# Patient Record
Sex: FEMALE | Race: WHITE | NOT HISPANIC OR LATINO | Employment: STUDENT | URBAN - METROPOLITAN AREA
[De-identification: names, ages, dates, MRNs, and addresses within clinical notes are randomized per-mention and may not be internally consistent; named-entity substitution may affect disease eponyms.]

---
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
## 2024-03-10 DIAGNOSIS — Z30.41 SURVEILLANCE FOR BIRTH CONTROL, ORAL CONTRACEPTIVES: ICD-10-CM

## 2024-03-11 RX ORDER — NORETHINDRONE ACETATE/ETHINYL ESTRADIOL AND FERROUS FUMARATE 1MG-20(24)
1 KIT ORAL DAILY
Qty: 90 TABLET | Refills: 1 | Status: SHIPPED | OUTPATIENT
Start: 2024-03-11 | End: 2024-09-07

## 2024-06-13 LAB
HBA1C MFR BLD HPLC: 4.8 %
HBA1C MFR BLD HPLC: 4.8 %

## 2024-07-15 ENCOUNTER — ANNUAL EXAM (OUTPATIENT)
Dept: OBGYN CLINIC | Facility: CLINIC | Age: 19
End: 2024-07-15
Payer: COMMERCIAL

## 2024-07-15 VITALS
SYSTOLIC BLOOD PRESSURE: 110 MMHG | BODY MASS INDEX: 23.32 KG/M2 | WEIGHT: 140 LBS | HEIGHT: 65 IN | DIASTOLIC BLOOD PRESSURE: 70 MMHG

## 2024-07-15 DIAGNOSIS — R10.2 PELVIC PAIN IN FEMALE: ICD-10-CM

## 2024-07-15 DIAGNOSIS — Z11.3 SCREEN FOR STD (SEXUALLY TRANSMITTED DISEASE): ICD-10-CM

## 2024-07-15 DIAGNOSIS — Z30.41 SURVEILLANCE FOR BIRTH CONTROL, ORAL CONTRACEPTIVES: ICD-10-CM

## 2024-07-15 DIAGNOSIS — Z01.419 ENCOUNTER FOR GYNECOLOGICAL EXAMINATION WITHOUT ABNORMAL FINDING: Primary | ICD-10-CM

## 2024-07-15 DIAGNOSIS — N94.6 DYSMENORRHEA IN ADOLESCENT: ICD-10-CM

## 2024-07-15 DIAGNOSIS — N92.0 MENORRHAGIA WITH REGULAR CYCLE: ICD-10-CM

## 2024-07-15 DIAGNOSIS — N89.8 VAGINAL ODOR: ICD-10-CM

## 2024-07-15 LAB
BV WHIFF TEST VAG QL: NORMAL
CLUE CELLS SPEC QL WET PREP: NORMAL
PH SMN: 4 [PH]
SL AMB POCT WET MOUNT: NORMAL
T VAGINALIS VAG QL WET PREP: NORMAL
YEAST VAG QL WET PREP: NORMAL

## 2024-07-15 PROCEDURE — 87210 SMEAR WET MOUNT SALINE/INK: CPT | Performed by: OBSTETRICS & GYNECOLOGY

## 2024-07-15 PROCEDURE — 99395 PREV VISIT EST AGE 18-39: CPT | Performed by: OBSTETRICS & GYNECOLOGY

## 2024-07-15 RX ORDER — NORETHINDRONE ACETATE/ETHINYL ESTRADIOL AND FERROUS FUMARATE 1MG-20(24)
1 KIT ORAL DAILY
Qty: 90 TABLET | Refills: 3 | Status: SHIPPED | OUTPATIENT
Start: 2024-07-15 | End: 2025-07-15

## 2024-07-15 NOTE — PROGRESS NOTES
[facility] - [address], [address], [place] [address]    ASSESSMENT/PLAN: Nesha Cary is a 18 y.o.  who presents for annual gynecologic exam.    Encounter for routine gynecologic examination  - Routine well woman exam completed today.  - HPV Vaccination status: Immunization series complete  - STI screening offered including HIV testing: Declined  - Contraceptive counseling discussed.  Current contraception: condoms or combination OCPs:     Additional problems addressed during this visit:  1. Encounter for gynecological examination without abnormal finding  2. Surveillance for birth control, oral contraceptives  -     norethindrone-ethinyl estradiol-ferrous fumarate (Louann 24 FE) 1-20 MG-MCG(24) per tablet; Take 1 tablet by mouth daily Pt likes  28 d  w  Brand necessary  3. Screen for STD (sexually transmitted disease)  -     Chlamydia/GC RITIKA, Confirmation  4. Menorrhagia with regular cycle  5. Dysmenorrhea in adolescent  6. Pelvic pain in female  Comments:  Normal pelvic  us   done at  [facility]  7. Vaginal odor  -     POCT wet mount    Pt on ocp likes  brand necessary and not the 21 d.  Needs 28 d.  No missed pills or  btb. + lower abd pain normal pelvic us. Dw pt we treat the symptoms of  endometrioses.  Only true dx is  surgery.  Will do the  pill  w  only break for bleeding for 4 days .    Wet mount neg today.  No under wear to bed at night. Open to air  and   no panti liners daily     CC:  Annual Gynecologic Examination    HPI: Nesha Cary is a 18 y.o.  who presents for annual gynecologic examination.  19 yo  here for [facility] sex.  + sexually acti e  using condoms.  On ocp no missed pills denies  achesa and  BTB.  + had US  due to lower abd pain .  Possibly endometriosis.    Us was normal.  Done at   [facility].   Bowels wnl . + vaginal odor not fishy. Using  boric acid w lil relief. Same partner.       The following portions of the patient's history were reviewed and  "updated as appropriate: She  has a past medical history of Heavy periods and Seasonal allergies.  She  has no past surgical history on file.  Her family history includes Breast cancer (age of onset: 47) in her mother; Colon cancer in her family; Diabetes type I in her father and mother; Ovarian cancer in her family; Thyroid disease in her mother.  She  reports that she has never smoked. She has never been exposed to tobacco smoke. She has never used smokeless tobacco. She reports that she does not drink alcohol and does not use drugs.  Current Outpatient Medications   Medication Sig Dispense Refill   • ibuprofen (MOTRIN) 200 mg tablet Take by mouth every 6 (six) hours as needed     • norethindrone-ethinyl estradiol-ferrous fumarate (Louann 24 FE) 1-20 MG-MCG(24) per tablet Take 1 tablet by mouth daily Pt likes  28 d  w  Brand necessary 90 tablet 3     No current facility-administered medications for this visit.     She has No Known Allergies..    Review of Systems   Eyes: Negative.    Respiratory: Negative.     Cardiovascular: Negative.    Gastrointestinal:  Positive for abdominal pain.   Endocrine: Negative.    Genitourinary:  Positive for dyspareunia, pelvic pain, urgency and vaginal discharge.   Musculoskeletal: Negative.    Skin: Negative.    Allergic/Immunologic: Negative.    Neurological: Negative.    Hematological: Negative.    Psychiatric/Behavioral: Negative.           Objective:  /70 (BP Location: Left arm, Patient Position: Sitting, Cuff Size: Standard)   Ht 5' 5\" (1.651 m)   Wt 63.5 kg (140 lb)   BMI 23.30 kg/m²    Physical Exam  Vitals and nursing note reviewed.   Constitutional:       Appearance: Normal appearance.   HENT:      Head: Normocephalic.   Cardiovascular:      Rate and Rhythm: Normal rate and regular rhythm.      Pulses: Normal pulses.      Heart sounds: Normal heart sounds.   Pulmonary:      Effort: Pulmonary effort is normal.      Breath sounds: Normal breath sounds.   Chest:      " Chest wall: No mass, lacerations, swelling, tenderness or edema.   Breasts:     Chava Score is 4.      Breasts are symmetrical.      Right: Normal. No swelling, bleeding, inverted nipple, mass, nipple discharge, skin change or tenderness.      Left: No swelling, bleeding, inverted nipple, mass, nipple discharge, skin change or tenderness.   Abdominal:      General: Abdomen is flat. Bowel sounds are normal.      Palpations: Abdomen is soft.   Genitourinary:     General: Normal vulva.      Exam position: Lithotomy position.      Pubic Area: No rash.       Chava stage (genital): 4.      Labia:         Right: No rash, tenderness or lesion.         Left: No rash, tenderness or lesion.       Urethra: No urethral pain, urethral swelling or urethral lesion.      Vagina: Normal.      Cervix: No cervical motion tenderness or discharge.      Uterus: Normal.       Adnexa: Right adnexa normal and left adnexa normal.      Rectum: Normal.   Musculoskeletal:         General: Normal range of motion.      Cervical back: Neck supple.   Lymphadenopathy:      Upper Body:      Right upper body: No supraclavicular, axillary or pectoral adenopathy.      Left upper body: No supraclavicular, axillary or pectoral adenopathy.      Lower Body: No right inguinal adenopathy. No left inguinal adenopathy.   Skin:     General: Skin is warm and dry.   Neurological:      General: No focal deficit present.      Mental Status: She is alert and oriented to person, place, and time.   Psychiatric:         Mood and Affect: Mood normal.         Behavior: Behavior normal.         Thought Content: Thought content normal.         Judgment: Judgment normal.

## 2024-07-15 NOTE — PATIENT INSTRUCTIONS
Pap every 3 years if normal,  starting at age 21.  STI testing as indicated, exercise most days of week, obtain appropriate diet and hydration, Calcium 1000mg + 600 vit D daily, birth control as directed (ACHES reviewed). Benefits, risks and alternatives discussed/reviewed. Condom use when sexually active for sexually transmitted infection prevention. HPV 9 vaccine recommended through age 45. Check with your insurance for coverage. If covered, call office to schedule start of vaccine series.  Annual mammogram starting at age 40, monthly breast self exam. Kegels   at red light or at least  20 times a day.

## 2024-07-21 DIAGNOSIS — A74.9 CHLAMYDIA: Primary | ICD-10-CM

## 2024-07-21 LAB
C TRACH RRNA SPEC QL NAA+PROBE: POSITIVE
N GONORRHOEA RRNA SPEC QL NAA+PROBE: NEGATIVE
SL AMB C. TRACHOMATIS NAA, CONFIRM: POSITIVE

## 2024-07-21 RX ORDER — DOXYCYCLINE 100 MG/1
100 TABLET ORAL 2 TIMES DAILY
Qty: 14 TABLET | Refills: 0 | Status: SHIPPED | OUTPATIENT
Start: 2024-07-21 | End: 2024-07-28

## 2024-07-28 NOTE — PROGRESS NOTES
"PROBLEM GYNECOLOGICAL VISIT    Nesha Cary is a 18 y.o. female who presents today  follow up for   chlamydia   history has been reviewed and she reports it as follows:    Past Medical History:   Diagnosis Date   • Chlamydia 2024    treated doxy   • Heavy periods    • Seasonal allergies      History reviewed. No pertinent surgical history.  OB History        0    Para   0    Term   0       0    AB   0    Living   0       SAB   0    IAB   0    Ectopic   0    Multiple   0    Live Births   0           Obstetric Comments   Menarche at  11    Gardasil  vaccine            Social History     Tobacco Use   • Smoking status: Never     Passive exposure: Never   • Smokeless tobacco: Never   Vaping Use   • Vaping status: Never Used   Substance Use Topics   • Alcohol use: Never     Comment: No alcohol use    • Drug use: Never     Comment: No        Current Outpatient Medications   Medication Instructions   • ibuprofen (MOTRIN) 200 mg tablet Oral, Every 6 hours PRN   • norethindrone-ethinyl estradiol-ferrous fumarate (Louann 24 FE) 1-20 MG-MCG(24) per tablet 1 tablet, Oral, Daily, Pt likes  28 d  w  Brand necessary       History of Present Illness:   + chlamydia screen on        Review of Systems:  Review of Systems   Constitutional: Negative.    Genitourinary:  Positive for vaginal discharge. Negative for vaginal bleeding and vaginal pain.   Neurological: Negative.    Hematological: Negative.    Psychiatric/Behavioral: Negative.         Physical Exam:  BP 94/64 (BP Location: Left arm, Patient Position: Sitting, Cuff Size: Standard)   Ht 5' 5\" (1.651 m)   Wt 64.9 kg (143 lb)   BMI 23.80 kg/m²   Physical Exam  Constitutional:       Appearance: Normal appearance.   Neurological:      Mental Status: She is alert.   Psychiatric:         Mood and Affect: Mood normal.         Behavior: Behavior normal.         Thought Content: Thought content normal.         Judgment: Judgment normal. "           Assessment:   1. Screen for  std,  Hx of chlamydia    I have spent a total time of 20 minutes in caring for this patient on the day of the visit/encounter including Diagnostic results, Risks and benefits of tx options, Instructions for management, Patient and family education, Importance of tx compliance, Risk factor reductions, Counseling / Coordination of care, Documenting in the medical record, Reviewing / ordering tests, medicine, procedures  , and Obtaining or reviewing history  .     Plan:   Condoms, partner  must be treated.   Needs  to screen for other  STDS   Hep B, C  HIV and  RPR ordered.   .  The  [facility]  may contact patient to make sure  everyone is treated.  Transmission , symptoms  treatments and fu not greater than 12 weeks dw pt.  Condoms!  Partner is being treated today.  Fu  kirit in 3mo  .  Referred to CDC website and fu  in  12 weeks.     Reviewed with patient that test results are available in [facility] immediately, but that they will not necessarily be reviewed by me immediately.  Explained that I will review results at my earliest opportunity and contact patient appropriately.

## 2024-07-29 ENCOUNTER — OFFICE VISIT (OUTPATIENT)
Dept: GASTROENTEROLOGY | Facility: CLINIC | Age: 19
End: 2024-07-29
Payer: COMMERCIAL

## 2024-07-29 ENCOUNTER — TELEPHONE (OUTPATIENT)
Dept: GASTROENTEROLOGY | Facility: CLINIC | Age: 19
End: 2024-07-29

## 2024-07-29 ENCOUNTER — OFFICE VISIT (OUTPATIENT)
Dept: OBGYN CLINIC | Facility: CLINIC | Age: 19
End: 2024-07-29
Payer: COMMERCIAL

## 2024-07-29 VITALS
HEIGHT: 65 IN | DIASTOLIC BLOOD PRESSURE: 60 MMHG | SYSTOLIC BLOOD PRESSURE: 112 MMHG | BODY MASS INDEX: 23.93 KG/M2 | WEIGHT: 143.6 LBS

## 2024-07-29 VITALS
BODY MASS INDEX: 23.82 KG/M2 | DIASTOLIC BLOOD PRESSURE: 64 MMHG | WEIGHT: 143 LBS | SYSTOLIC BLOOD PRESSURE: 94 MMHG | HEIGHT: 65 IN

## 2024-07-29 DIAGNOSIS — K21.9 GASTROESOPHAGEAL REFLUX DISEASE, UNSPECIFIED WHETHER ESOPHAGITIS PRESENT: ICD-10-CM

## 2024-07-29 DIAGNOSIS — Z80.0 FAMILY HISTORY OF COLON CANCER: ICD-10-CM

## 2024-07-29 DIAGNOSIS — R19.7 DIARRHEA, UNSPECIFIED TYPE: ICD-10-CM

## 2024-07-29 DIAGNOSIS — R10.31 RIGHT LOWER QUADRANT ABDOMINAL PAIN: Primary | ICD-10-CM

## 2024-07-29 DIAGNOSIS — R63.4 WEIGHT LOSS, ABNORMAL: ICD-10-CM

## 2024-07-29 DIAGNOSIS — R10.2 PELVIC PAIN IN FEMALE: ICD-10-CM

## 2024-07-29 DIAGNOSIS — A74.9 CHLAMYDIA: Primary | ICD-10-CM

## 2024-07-29 DIAGNOSIS — Z11.3 SCREEN FOR STD (SEXUALLY TRANSMITTED DISEASE): ICD-10-CM

## 2024-07-29 DIAGNOSIS — R10.33 PERIUMBILICAL ABDOMINAL PAIN: ICD-10-CM

## 2024-07-29 PROCEDURE — 99213 OFFICE O/P EST LOW 20 MIN: CPT | Performed by: OBSTETRICS & GYNECOLOGY

## 2024-07-29 PROCEDURE — 99204 OFFICE O/P NEW MOD 45 MIN: CPT | Performed by: NURSE PRACTITIONER

## 2024-07-29 RX ORDER — OMEPRAZOLE 20 MG/1
20 CAPSULE, DELAYED RELEASE ORAL
Qty: 30 CAPSULE | Refills: 6 | Status: SHIPPED | OUTPATIENT
Start: 2024-07-29

## 2024-07-29 RX ORDER — POLYETHYLENE GLYCOL 3350 17 G/17G
238 POWDER, FOR SOLUTION ORAL ONCE
Qty: 238 G | Refills: 0 | Status: SHIPPED | OUTPATIENT
Start: 2024-07-29 | End: 2024-07-29

## 2024-07-29 RX ORDER — DICYCLOMINE HYDROCHLORIDE 10 MG/1
10 CAPSULE ORAL
Qty: 120 CAPSULE | Refills: 6 | Status: SHIPPED | OUTPATIENT
Start: 2024-07-29

## 2024-07-29 NOTE — PROGRESS NOTES
Count includes the [facility] - Outpatient Consultation  Nesha Cary 18 y.o. female MRN: [id number]  Encounter: [id number]    ASSESSMENT AND PLAN:      1. Right lower quadrant abdominal pain  2. Periumbilical abdominal pain  Patient presents for increased lower abdominal pain for approximately 6 months.  Patient states she does note increased discomfort after eating however not always directly after eating.  Noting patient's symptoms of diarrhea for same time.  Consider IBS vs IBD.  Patient is adopted, unclear if family history of Crohn's or autoimmune disorders.  Reevaluate with results of EGD and colonoscopy.    - Colonoscopy scheduled at [facility]  - polyethylene glycol (MiraLax) 17 GM/SCOOP powder; Take 238 g by mouth once for 1 dose Take 238 g my mouth. Use as directed  Dispense: 238 g; Refill: 0  - dicyclomine (BENTYL) 10 mg capsule; Take 1 capsule (10 mg total) by mouth 4 (four) times a day (before meals and at bedtime)  Dispense: 120 capsule; Refill: 6  -Discussed with patient dietary discretion    3. Diarrhea, unspecified type  Patient states that her episodes of diarrhea have also been around for about 6 months.  She states she is having 2-3 loose bowel movements per day.  Patient did have multiple stool studies completed which were all negative.  Consider infectious versus inflammatory.  Rule out IBS vs IBD.    - Colonoscopy scheduled at [facility]  -Discussed with patient dietary discretion and adequate fiber and fluids.  -Patient education for examples of dietary fiber attached to discharge summary    4. Gastroesophageal reflux disease, unspecified whether esophagitis present  Patient states she has been using Tums daily for increased acid reflux and breakthrough symptoms.  Discussed with patient initiating omeprazole 20 mg daily.  Consider GERD, functional dyspepsia, gastritis.    - EGD scheduled at [facility]  - omeprazole (PriLOSEC) 20 mg delayed release capsule; Take 1 capsule (20 mg total)  by mouth daily before breakfast  Dispense: 30 capsule; Refill: 6    5.  Weight loss  Patient does note that from her last doctor's office visit she was told she has lost approximately 20 pounds since November 2023.  Patient states she had been exercising and does note she had increased illness during that time period as well.  Reevaluate with the completion of both EGD and colonoscopy.  Extensive lab work was completed which all at this time have been negative.    6. Family history of colon cancer  Patient states she is adopted but however has been told that there is colon cancer in both her biological parents, possibly grandparents.      Followup Appointment: 2 weeks after procedures  ______________________________________________________________________    Chief Complaint   Patient presents with    Diarrhea     frequent    Abdominal Pain     lower       HPI:   18-year-old female with no significant past medical history presents with lower abdominal discomfort and diarrhea times approximately 6 months.    On exam, patient denies nausea or or vomiting.  She states she does have increased abdominal discomfort usually after eating.  States she does have increased acid reflux symptoms and has been taking Tums almost every day.  States she is having 2-3 loose bowel movements per day she does notice some hematochezia occasionally to the tissue however denies melena.  Non-smoker, rare EtOH use.  Patient states she has had approximately a 20 pound weight loss since November of this past year however she does states she had been exercising a little more however also had increased illness.  Recent lab work noted; negative celiac panel, CBC, CMP, TSH and iron panel all normal.  Stool studies for culture, C. difficile, ova and parasite and fecal calprotectin were normal.  Patient does states she had been on an antibiotic recently for STD.  Believes it was doxycycline for 7 days.  She denies increased NSAID use.  Patient states  "she is adopted however she has been told that her birth parents both have colon cancer in their family not the parents but possibly grandparents.      Historical Information   Past Medical History:   Diagnosis Date    Chlamydia 07/2024    treated doxy    Heavy periods     Seasonal allergies      Past Surgical History:   Procedure Laterality Date    WISDOM TOOTH EXTRACTION       Social History     Substance and Sexual Activity   Alcohol Use Never    Comment: No alcohol use      Social History     Substance and Sexual Activity   Drug Use Never    Comment: No      Social History     Tobacco Use   Smoking Status Never    Passive exposure: Never   Smokeless Tobacco Never     Family History   Adopted: Yes   Problem Relation Age of Onset    Breast cancer Mother 47    Thyroid disease Mother     Diabetes type I Mother     Diabetes type I Father     Colon cancer Family     Ovarian cancer Family        Meds/Allergies     Current Outpatient Medications:     dicyclomine (BENTYL) 10 mg capsule    ibuprofen (MOTRIN) 200 mg tablet    norethindrone-ethinyl estradiol-ferrous fumarate (Louann 24 FE) 1-20 MG-MCG(24) per tablet    omeprazole (PriLOSEC) 20 mg delayed release capsule    polyethylene glycol (MiraLax) 17 GM/SCOOP powder    No Known Allergies    PHYSICAL EXAM:    Blood pressure 112/60, height 5' 5\" (1.651 m), weight 65.1 kg (143 lb 9.6 oz). Body mass index is 23.9 kg/m².    General Appearance: NAD, cooperative, alert  Eyes: Anicteric, PERRLA, EOMI  ENT:  Normocephalic, atraumatic, normal mucosa.    Neck:  Supple, symmetrical, trachea midline,   Resp:  Clear to auscultation bilaterally; no rales, rhonchi or wheezing; respirations unlabored   CV:  S1 S2, Regular rate and rhythm; no murmur, rub, or gallop.  GI:  Soft, mid abdomen and right lower quadrant discomfort with palpation, non-distended; normal bowel sounds; no masses, no organomegaly   Rectal: Deferred  Musculoskeletal: No cyanosis, clubbing or edema. Normal ROM.  Skin: " " No jaundice, rashes, or lesions   Heme/Lymph: No palpable cervical lymphadenopathy  Psych: Normal affect, good eye contact  Neuro: No gross deficits, AAOx3    Lab Results:   Lab Results   Component Value Date    WBC 3.7 05/07/2021    HGB 13.6 05/07/2021    HCT 39.2 05/07/2021    MCV 91 05/07/2021     05/07/2021     No results found for: \"NA\", \"K\", \"CL\", \"CO2\", \"ANIONGAP\", \"BUN\", \"CREATININE\", \"GLUCOSE\", \"GLUF\", \"CALCIUM\", \"CORRECTEDCA\", \"AST\", \"ALT\", \"ALKPHOS\", \"PROT\", \"BILITOT\", \"EGFR\"  No results found for: \"IRON\", \"TIBC\", \"FERRITIN\"  No results found for: \"LIPASE\"    Radiology Results:   No results found.      REVIEW OF SYSTEMS:    CONSTITUTIONAL: Denies any fever, chills, rigors, and weight loss.  HEENT: No earache or tinnitus. Denies hearing loss or visual disturbances.  CARDIOVASCULAR: No chest pain or palpitations.   RESPIRATORY: Denies any cough, hemoptysis, shortness of breath or dyspnea on exertion.  GASTROINTESTINAL: As noted in the History of Present Illness.   GENITOURINARY: No problems with urination. Denies any hematuria or dysuria.  NEUROLOGIC: No dizziness or vertigo, denies headaches.   MUSCULOSKELETAL: Denies any muscle or joint pain.   SKIN: Denies skin rashes or itching.   ENDOCRINE: Denies excessive thirst. Denies intolerance to heat or cold.  PSYCHOSOCIAL: Denies depression or anxiety. Denies any recent memory loss.   "

## 2024-07-29 NOTE — H&P (VIEW-ONLY)
[facility] - Outpatient Consultation  Nesha Cary 18 y.o. female MRN: [id number]  Encounter: [id number]    ASSESSMENT AND PLAN:      1. Right lower quadrant abdominal pain  2. Periumbilical abdominal pain  Patient presents for increased lower abdominal pain for approximately 6 months.  Patient states she does note increased discomfort after eating however not always directly after eating.  Noting patient's symptoms of diarrhea for same time.  Consider IBS vs IBD.  Patient is adopted, unclear if family history of Crohn's or autoimmune disorders.  Reevaluate with results of EGD and colonoscopy.    - Colonoscopy scheduled at [facility]  - polyethylene glycol (MiraLax) 17 GM/SCOOP powder; Take 238 g by mouth once for 1 dose Take 238 g my mouth. Use as directed  Dispense: 238 g; Refill: 0  - dicyclomine (BENTYL) 10 mg capsule; Take 1 capsule (10 mg total) by mouth 4 (four) times a day (before meals and at bedtime)  Dispense: 120 capsule; Refill: 6  -Discussed with patient dietary discretion    3. Diarrhea, unspecified type  Patient states that her episodes of diarrhea have also been around for about 6 months.  She states she is having 2-3 loose bowel movements per day.  Patient did have multiple stool studies completed which were all negative.  Consider infectious versus inflammatory.  Rule out IBS vs IBD.    - Colonoscopy scheduled at [facility]  -Discussed with patient dietary discretion and adequate fiber and fluids.  -Patient education for examples of dietary fiber attached to discharge summary    4. Gastroesophageal reflux disease, unspecified whether esophagitis present  Patient states she has been using Tums daily for increased acid reflux and breakthrough symptoms.  Discussed with patient initiating omeprazole 20 mg daily.  Consider GERD, functional dyspepsia, gastritis.    - EGD scheduled at [facility]  - omeprazole (PriLOSEC) 20 mg delayed release capsule; Take 1 capsule (20 mg total)  by mouth daily before breakfast  Dispense: 30 capsule; Refill: 6    5.  Weight loss  Patient does note that from her last doctor's office visit she was told she has lost approximately 20 pounds since November 2023.  Patient states she had been exercising and does note she had increased illness during that time period as well.  Reevaluate with the completion of both EGD and colonoscopy.  Extensive lab work was completed which all at this time have been negative.    6. Family history of colon cancer  Patient states she is adopted but however has been told that there is colon cancer in both her biological parents, possibly grandparents.      Followup Appointment: 2 weeks after procedures  ______________________________________________________________________    Chief Complaint   Patient presents with    Diarrhea     frequent    Abdominal Pain     lower       HPI:   18-year-old female with no significant past medical history presents with lower abdominal discomfort and diarrhea times approximately 6 months.    On exam, patient denies nausea or or vomiting.  She states she does have increased abdominal discomfort usually after eating.  States she does have increased acid reflux symptoms and has been taking Tums almost every day.  States she is having 2-3 loose bowel movements per day she does notice some hematochezia occasionally to the tissue however denies melena.  Non-smoker, rare EtOH use.  Patient states she has had approximately a 20 pound weight loss since November of this past year however she does states she had been exercising a little more however also had increased illness.  Recent lab work noted; negative celiac panel, CBC, CMP, TSH and iron panel all normal.  Stool studies for culture, C. difficile, ova and parasite and fecal calprotectin were normal.  Patient does states she had been on an antibiotic recently for STD.  Believes it was doxycycline for 7 days.  She denies increased NSAID use.  Patient states  "she is adopted however she has been told that her birth parents both have colon cancer in their family not the parents but possibly grandparents.      Historical Information   Past Medical History:   Diagnosis Date    Chlamydia 07/2024    treated doxy    Heavy periods     Seasonal allergies      Past Surgical History:   Procedure Laterality Date    WISDOM TOOTH EXTRACTION       Social History     Substance and Sexual Activity   Alcohol Use Never    Comment: No alcohol use      Social History     Substance and Sexual Activity   Drug Use Never    Comment: No      Social History     Tobacco Use   Smoking Status Never    Passive exposure: Never   Smokeless Tobacco Never     Family History   Adopted: Yes   Problem Relation Age of Onset    Breast cancer Mother 47    Thyroid disease Mother     Diabetes type I Mother     Diabetes type I Father     Colon cancer Family     Ovarian cancer Family        Meds/Allergies     Current Outpatient Medications:     dicyclomine (BENTYL) 10 mg capsule    ibuprofen (MOTRIN) 200 mg tablet    norethindrone-ethinyl estradiol-ferrous fumarate (Louann 24 FE) 1-20 MG-MCG(24) per tablet    omeprazole (PriLOSEC) 20 mg delayed release capsule    polyethylene glycol (MiraLax) 17 GM/SCOOP powder    No Known Allergies    PHYSICAL EXAM:    Blood pressure 112/60, height 5' 5\" (1.651 m), weight 65.1 kg (143 lb 9.6 oz). Body mass index is 23.9 kg/m².    General Appearance: NAD, cooperative, alert  Eyes: Anicteric, PERRLA, EOMI  ENT:  Normocephalic, atraumatic, normal mucosa.    Neck:  Supple, symmetrical, trachea midline,   Resp:  Clear to auscultation bilaterally; no rales, rhonchi or wheezing; respirations unlabored   CV:  S1 S2, Regular rate and rhythm; no murmur, rub, or gallop.  GI:  Soft, mid abdomen and right lower quadrant discomfort with palpation, non-distended; normal bowel sounds; no masses, no organomegaly   Rectal: Deferred  Musculoskeletal: No cyanosis, clubbing or edema. Normal ROM.  Skin: " " No jaundice, rashes, or lesions   Heme/Lymph: No palpable cervical lymphadenopathy  Psych: Normal affect, good eye contact  Neuro: No gross deficits, AAOx3    Lab Results:   Lab Results   Component Value Date    WBC 3.7 05/07/2021    HGB 13.6 05/07/2021    HCT 39.2 05/07/2021    MCV 91 05/07/2021     05/07/2021     No results found for: \"NA\", \"K\", \"CL\", \"CO2\", \"ANIONGAP\", \"BUN\", \"CREATININE\", \"GLUCOSE\", \"GLUF\", \"CALCIUM\", \"CORRECTEDCA\", \"AST\", \"ALT\", \"ALKPHOS\", \"PROT\", \"BILITOT\", \"EGFR\"  No results found for: \"IRON\", \"TIBC\", \"FERRITIN\"  No results found for: \"LIPASE\"    Radiology Results:   No results found.      REVIEW OF SYSTEMS:    CONSTITUTIONAL: Denies any fever, chills, rigors, and weight loss.  HEENT: No earache or tinnitus. Denies hearing loss or visual disturbances.  CARDIOVASCULAR: No chest pain or palpitations.   RESPIRATORY: Denies any cough, hemoptysis, shortness of breath or dyspnea on exertion.  GASTROINTESTINAL: As noted in the History of Present Illness.   GENITOURINARY: No problems with urination. Denies any hematuria or dysuria.  NEUROLOGIC: No dizziness or vertigo, denies headaches.   MUSCULOSKELETAL: Denies any muscle or joint pain.   SKIN: Denies skin rashes or itching.   ENDOCRINE: Denies excessive thirst. Denies intolerance to heat or cold.  PSYCHOSOCIAL: Denies depression or anxiety. Denies any recent memory loss.   "

## 2024-07-29 NOTE — TELEPHONE ENCOUNTER
Scheduled date of combo (as of today):08/07/24  Physician performing combo:Michael  [facility]  Bowel prep reviewed with patient:Miralax  Instructions reviewed with patient by:LISA  Clearances: NONE

## 2024-07-30 LAB
HCV AB S/CO SERPL IA: NON REACTIVE
HIV 1+2 AB+HIV1 P24 AG SERPL QL IA: NON REACTIVE
RPR SER QL: NON REACTIVE

## 2024-08-01 ENCOUNTER — ANESTHESIA EVENT (OUTPATIENT)
Dept: ANESTHESIOLOGY | Facility: AMBULATORY SURGERY CENTER | Age: 19
End: 2024-08-01

## 2024-08-01 ENCOUNTER — ANESTHESIA (OUTPATIENT)
Dept: ANESTHESIOLOGY | Facility: AMBULATORY SURGERY CENTER | Age: 19
End: 2024-08-01

## 2024-08-07 ENCOUNTER — ANESTHESIA (OUTPATIENT)
Dept: GASTROENTEROLOGY | Facility: AMBULATORY SURGERY CENTER | Age: 19
End: 2024-08-07

## 2024-08-07 ENCOUNTER — HOSPITAL ENCOUNTER (OUTPATIENT)
Dept: GASTROENTEROLOGY | Facility: AMBULATORY SURGERY CENTER | Age: 19
Discharge: HOME | End: 2024-08-07
Payer: COMMERCIAL

## 2024-08-07 ENCOUNTER — ANESTHESIA EVENT (OUTPATIENT)
Dept: GASTROENTEROLOGY | Facility: AMBULATORY SURGERY CENTER | Age: 19
End: 2024-08-07

## 2024-08-07 VITALS
BODY MASS INDEX: 23.49 KG/M2 | TEMPERATURE: 99.3 F | HEART RATE: 61 BPM | SYSTOLIC BLOOD PRESSURE: 109 MMHG | RESPIRATION RATE: 16 BRPM | HEIGHT: 65 IN | DIASTOLIC BLOOD PRESSURE: 64 MMHG | WEIGHT: 141 LBS | OXYGEN SATURATION: 100 %

## 2024-08-07 DIAGNOSIS — R10.31 RIGHT LOWER QUADRANT ABDOMINAL PAIN: ICD-10-CM

## 2024-08-07 DIAGNOSIS — K21.9 GASTROESOPHAGEAL REFLUX DISEASE, UNSPECIFIED WHETHER ESOPHAGITIS PRESENT: ICD-10-CM

## 2024-08-07 DIAGNOSIS — R19.7 DIARRHEA, UNSPECIFIED TYPE: ICD-10-CM

## 2024-08-07 LAB
EXT PREGNANCY TEST URINE: NEGATIVE
EXT. CONTROL: NORMAL

## 2024-08-07 PROCEDURE — 43239 EGD BIOPSY SINGLE/MULTIPLE: CPT | Performed by: INTERNAL MEDICINE

## 2024-08-07 PROCEDURE — 45380 COLONOSCOPY AND BIOPSY: CPT | Performed by: INTERNAL MEDICINE

## 2024-08-07 PROCEDURE — 88305 TISSUE EXAM BY PATHOLOGIST: CPT | Performed by: PATHOLOGY

## 2024-08-07 RX ORDER — LIDOCAINE HYDROCHLORIDE 10 MG/ML
INJECTION, SOLUTION EPIDURAL; INFILTRATION; INTRACAUDAL; PERINEURAL AS NEEDED
Status: DISCONTINUED | OUTPATIENT
Start: 2024-08-07 | End: 2024-08-07

## 2024-08-07 RX ORDER — SODIUM CHLORIDE, SODIUM LACTATE, POTASSIUM CHLORIDE, CALCIUM CHLORIDE 600; 310; 30; 20 MG/100ML; MG/100ML; MG/100ML; MG/100ML
50 INJECTION, SOLUTION INTRAVENOUS CONTINUOUS
Status: DISCONTINUED | OUTPATIENT
Start: 2024-08-07 | End: 2024-08-11 | Stop reason: HOSPADM

## 2024-08-07 RX ORDER — PROPOFOL 10 MG/ML
INJECTION, EMULSION INTRAVENOUS AS NEEDED
Status: DISCONTINUED | OUTPATIENT
Start: 2024-08-07 | End: 2024-08-07

## 2024-08-07 RX ADMIN — PROPOFOL 50 MG: 10 INJECTION, EMULSION INTRAVENOUS at 10:50

## 2024-08-07 RX ADMIN — PROPOFOL 30 MG: 10 INJECTION, EMULSION INTRAVENOUS at 11:04

## 2024-08-07 RX ADMIN — PROPOFOL 50 MG: 10 INJECTION, EMULSION INTRAVENOUS at 10:56

## 2024-08-07 RX ADMIN — LIDOCAINE HYDROCHLORIDE 40 MG: 10 INJECTION, SOLUTION EPIDURAL; INFILTRATION; INTRACAUDAL; PERINEURAL at 10:48

## 2024-08-07 RX ADMIN — PROPOFOL 50 MG: 10 INJECTION, EMULSION INTRAVENOUS at 10:49

## 2024-08-07 RX ADMIN — SODIUM CHLORIDE, SODIUM LACTATE, POTASSIUM CHLORIDE, CALCIUM CHLORIDE 50 ML/HR: 600; 310; 30; 20 INJECTION, SOLUTION INTRAVENOUS at 10:44

## 2024-08-07 RX ADMIN — PROPOFOL 50 MG: 10 INJECTION, EMULSION INTRAVENOUS at 10:51

## 2024-08-07 RX ADMIN — PROPOFOL 50 MG: 10 INJECTION, EMULSION INTRAVENOUS at 10:53

## 2024-08-07 RX ADMIN — PROPOFOL 30 MG: 10 INJECTION, EMULSION INTRAVENOUS at 11:02

## 2024-08-07 RX ADMIN — PROPOFOL 20 MG: 10 INJECTION, EMULSION INTRAVENOUS at 11:05

## 2024-08-07 RX ADMIN — PROPOFOL 30 MG: 10 INJECTION, EMULSION INTRAVENOUS at 11:07

## 2024-08-07 RX ADMIN — PROPOFOL 50 MG: 10 INJECTION, EMULSION INTRAVENOUS at 10:59

## 2024-08-07 RX ADMIN — PROPOFOL 50 MG: 10 INJECTION, EMULSION INTRAVENOUS at 11:00

## 2024-08-07 NOTE — INTERVAL H&P NOTE
H&P reviewed. After examining the patient I find no changes in the patients condition since the H&P had been written.    Vitals:    08/07/24 1034   BP: 124/73   Pulse: 61   Resp: 15   Temp: 99.3 °F (37.4 °C)   SpO2: 99%

## 2024-08-07 NOTE — ANESTHESIA POSTPROCEDURE EVALUATION
Post-Op Assessment Note    CV Status:  Stable  Pain Score: 0    Pain management: adequate       Mental Status:  Arousable and sleepy   Hydration Status:  Euvolemic   PONV Controlled:  Controlled   Airway Patency:  Patent     Post Op Vitals Reviewed: Yes    No anethesia notable event occurred.    Staff: Anesthesiologist, with CRNAs               BP      Temp      Pulse     Resp      SpO2         Patient reports to the ED with mid-abdominal pain x 2 weeks. Denies any n/v/d

## 2024-08-07 NOTE — ANESTHESIA PREPROCEDURE EVALUATION
Procedure:  COLONOSCOPY  EGD    Relevant Problems   GI/HEPATIC   (+) Gastroesophageal reflux disease   20 lb weight loss, abdominal pain    Physical Exam    Airway    Mallampati score: II    Neck ROM: full     Dental   No notable dental hx     Cardiovascular      Pulmonary      Other Findings  post-pubertal.      Anesthesia Plan  ASA Score- 2     Anesthesia Type- IV sedation with anesthesia with ASA Monitors.         Additional Monitors:     Airway Plan:            Plan Factors-Exercise tolerance (METS): >4 METS.    Chart reviewed.    Patient summary reviewed.    Patient is not a current smoker.              Induction- intravenous.    Postoperative Plan-         Informed Consent- Anesthetic plan and risks discussed with patient.  I personally reviewed this patient with the CRNA. Discussed and agreed on the Anesthesia Plan with the CRNA..

## 2024-08-15 PROCEDURE — 88305 TISSUE EXAM BY PATHOLOGIST: CPT | Performed by: PATHOLOGY

## 2024-08-21 ENCOUNTER — OFFICE VISIT (OUTPATIENT)
Dept: GASTROENTEROLOGY | Facility: CLINIC | Age: 19
End: 2024-08-21
Payer: COMMERCIAL

## 2024-08-21 VITALS
HEIGHT: 65 IN | DIASTOLIC BLOOD PRESSURE: 60 MMHG | WEIGHT: 146 LBS | BODY MASS INDEX: 24.32 KG/M2 | SYSTOLIC BLOOD PRESSURE: 108 MMHG

## 2024-08-21 DIAGNOSIS — R10.30 LOWER ABDOMINAL PAIN: ICD-10-CM

## 2024-08-21 DIAGNOSIS — K21.9 GASTROESOPHAGEAL REFLUX DISEASE, UNSPECIFIED WHETHER ESOPHAGITIS PRESENT: ICD-10-CM

## 2024-08-21 DIAGNOSIS — Z98.890 HISTORY OF COLONOSCOPY: ICD-10-CM

## 2024-08-21 DIAGNOSIS — R19.5 LOOSE STOOLS: Primary | ICD-10-CM

## 2024-08-21 PROCEDURE — 99214 OFFICE O/P EST MOD 30 MIN: CPT | Performed by: NURSE PRACTITIONER

## 2024-08-21 NOTE — PROGRESS NOTES
[facility] - Outpatient Follow-up Note  Nesha Cary 18 y.o. female MRN: [id number]  Encounter: [id number]    ASSESSMENT AND PLAN:      1. Loose stools  2. Lower abdominal pain  Colonoscopy and EGD completed on 8/7/2024 essentially negative.  Discussed with patient FODMAP diet and dietary discretion.  Also discussed continuity of taking medications as prescribed may also be helpful.  Consider IBS-D, functional diarrhea.    -Discussed food diary, food luke for logging dietary intake  -Continue dicyclomine 10 mg prior to meals and at bedtime.  May increase to 20 mg if needed and send [employer] message so prescription can be changed.  -FODMAP diet packet to be given at checkout.  -Discussed fiber and fluids, patient does acknowledge patient education that was attached to her prior discharge summary.  -Discussed with patient and her mother trialing this conservative information/medical treatment plan.  May consider trial of amitriptyline low-dose at bedtime pending results of treatment plan.  -Reviewed EGD and colonoscopy with patient and her mother.    3. Gastroesophageal reflux disease, unspecified whether esophagitis present  Patient states she is not having any acid reflux symptoms if she remembers to take her omeprazole 20 mg daily.  Also discussed dietary discretion.    -Continue omeprazole 20 mg daily  -Discussed food diary and or food luke on her phone    4. History of colonoscopy  Colonoscopy 8/7/2024; age appropriate recall.      Followup Appointment: 3 months  ______________________________________________________________________      HPI:    18-year-old female accompanied by her mother and with no significant past medical history presents for follow-up EGD and colonoscopy from 8/7/2024.  EGD and colonoscopy were ordered due to patient's increased complaint of abdominal discomfort, diarrhea, GERD and weight loss.  Results of colonoscopy essentially normal.  Patient does  have family history of colon cancer.  EGD essentially normal with negative biopsy.  Patient states she continues to have loose bowel movements approximately 2-3 a day.  She also notes lower abdominal discomfort increased after eating and increased in the a.m.  Exam she denies nausea, vomiting or any acid reflux symptoms.  She is currently taking omeprazole 20 mg daily.  She denies hematochezia or melena.  Non-smoker, denies EtOH use.  Patient has actually gained 3 pounds since her office visit from 7/29.  She was 143 at that time and she is now 146 at today's office appointment.  In discussion with patient and her mother, patient has been missing medication dosing which may also lead to some of her ongoing symptoms.  Discussed also food diary or a food luke on her phone in order to log her dietary intake and especially fiber.  Also discussed FODMAP diet.  Patient is exhibiting some signs which could be associated with IBS with an apparent history of anxiety or anxious disorder patient is not currently taking any medications and did discuss taking a conservative route first and then if needed evaluate trialing amitriptyline low-dose at bedtime.    Historical Information   Past Medical History:   Diagnosis Date    Chlamydia 07/2024    treated doxy    Heavy periods     Seasonal allergies      Past Surgical History:   Procedure Laterality Date    WISDOM TOOTH EXTRACTION       Social History     Substance and Sexual Activity   Alcohol Use Never    Comment: No alcohol use      Social History     Substance and Sexual Activity   Drug Use Never    Comment: No      Social History     Tobacco Use   Smoking Status Never    Passive exposure: Never   Smokeless Tobacco Never     Family History   Adopted: Yes   Problem Relation Age of Onset    Breast cancer Mother 47    Thyroid disease Mother     Diabetes type I Mother     Diabetes type I Father     Colon cancer Family     Ovarian cancer Family          Current Outpatient  "Medications:     dicyclomine (BENTYL) 10 mg capsule    ibuprofen (MOTRIN) 200 mg tablet    norethindrone-ethinyl estradiol-ferrous fumarate (Louann 24 FE) 1-20 MG-MCG(24) per tablet    omeprazole (PriLOSEC) 20 mg delayed release capsule  No Known Allergies  Reviewed medications and allergies and updated as indicated    PHYSICAL EXAM:    Blood pressure 108/60, height 5' 5\" (1.651 m), weight 66.2 kg (146 lb). Body mass index is 24.3 kg/m².    Normal exam    General Appearance: NAD, cooperative, alert  Eyes: Anicteric, PERRLA, EOMI  ENT:  Normocephalic, atraumatic, normal mucosa.    Neck:  Supple, symmetrical, trachea midline  Resp:  Clear to auscultation bilaterally; no rales, rhonchi or wheezing; respirations unlabored   CV:  S1 S2, Regular rate and rhythm; no murmur, rub, or gallop.  GI:  Soft, non-tender, non-distended; normal bowel sounds; no masses, no organomegaly   Rectal: Deferred  Musculoskeletal: No cyanosis, clubbing or edema. Normal ROM.  Skin:  No jaundice, rashes, or lesions   Heme/Lymph: No palpable cervical lymphadenopathy  Psych: Normal affect, good eye contact  Neuro: No gross deficits, AAOx3    Lab Results:   Lab Results   Component Value Date    WBC 3.7 05/07/2021    HGB 13.6 05/07/2021    HCT 39.2 05/07/2021    MCV 91 05/07/2021     05/07/2021     No results found for: \"NA\", \"K\", \"CL\", \"CO2\", \"ANIONGAP\", \"BUN\", \"CREATININE\", \"GLUCOSE\", \"GLUF\", \"CALCIUM\", \"CORRECTEDCA\", \"AST\", \"ALT\", \"ALKPHOS\", \"PROT\", \"BILITOT\", \"EGFR\"  No results found for: \"IRON\", \"TIBC\", \"FERRITIN\"  No results found for: \"LIPASE\"    Radiology Results:   Colonoscopy    Result Date: 8/7/2024  Narrative: Table formatting from the original result was not included. [facility] [address] [place] [facility] [id number] [phone] DATE OF SERVICE: 8/07/24 PHYSICIAN(S): Attending: Regan Rodriguez DO Fellow: No Staff Documented INDICATION: Right lower quadrant abdominal pain, Diarrhea, " unspecified type POST-OP DIAGNOSIS: See the impression below. HISTORY: Prior colonoscopy: No prior colonoscopy. BOWEL PREPARATION: Miralax/Dulcolax PREPROCEDURE: Informed consent was obtained for the procedure, including sedation. Risks including but not limited to bleeding, infection, perforation, adverse drug reaction and aspiration were explained in detail. Also explained about less than 100% sensitivity with the exam and other alternatives. The patient was placed in the left lateral decubitus position. Procedure: Colonoscopy DETAILS OF PROCEDURE: Patient was taken to the procedure room where a time out was performed to confirm correct patient and correct procedure. The patient underwent monitored anesthesia care, which was administered by an anesthesia professional. The patient's blood pressure, ECG and oxygen were monitored throughout the procedure. A digital rectal exam was performed. A perianal exam was performed. The scope was introduced through the anus and advanced to the terminal ileum. Retroflexion was performed in the rectum. The quality of bowel preparation was evaluated using the Lyons Bowel Preparation Scale with scores of: right colon = 2, transverse colon = 2, left colon = 2. The total BBPS score was 6. Bowel prep was adequate. The patient experienced no blood loss. The procedure was not difficult. The patient tolerated the procedure well. There were no apparent adverse events. ANESTHESIA INFORMATION: ASA: II Anesthesia Type: IV Sedation with Anesthesia MEDICATIONS: No administrations occurring from 1048 to 1115 on 08/07/24 FINDINGS: The terminal ileum and entire colon appeared normal. Performed random biopsy using biopsy forceps. EVENTS: Procedure Events Event Event Time ENDO SCOPE OUT TIME 8/7/2024 11:13 AM SPECIMENS: ID Type Source Tests Collected by Time Destination 1 : duodemun bx r/o celiac Tissue Duodenum TISSUE EXAM Regan Rodriguez DO 8/7/2024 10:59 AM  2 : grastric bx r/o HP  Tissue Stomach TISSUE EXAM Regan Rodriguez DO 8/7/2024 11:01 AM  3 : RANDOM COLON BX R/O MICROSCOPIC COLITIS Tissue Colon TISSUE EXAM Regan Rodriguez DO 8/7/2024 11:14 AM  EQUIPMENT: Colonoscope -[id number] using this scope for both procuedures     Impression: The terminal ileum and entire colon appeared normal. Performed random biopsy using biopsy forceps. RECOMMENDATION: Await pathology results   Regan Rodriguez DO     EGD    Result Date: 8/7/2024  Narrative: Table formatting from the original result was not included. [facility] [address] [place] [id number] [phone] DATE OF SERVICE: 8/07/24 PHYSICIAN(S): Attending: Regan Rodriguez DO Fellow: No Staff Documented INDICATION: Gastroesophageal reflux disease, unspecified whether esophagitis present POST-OP DIAGNOSIS: See the impression below. PREPROCEDURE: Informed consent was obtained for the procedure, including sedation.  Risks of perforation, hemorrhage, adverse drug reaction and aspiration were discussed. The patient was placed in the left lateral decubitus position. Patient was explained about the risks and benefits of the procedure. Risks including but not limited to bleeding, infection, and perforation were explained in detail. Also explained about less than 100% sensitivity with the exam and other alternatives. PROCEDURE: EGD DETAILS OF PROCEDURE: Patient was taken to the procedure room where a time out was performed to confirm correct patient and correct procedure. The patient underwent monitored anesthesia care, which was administered by an anesthesia professional. The patient's blood pressure, ECG and oxygen were monitored throughout the procedure. The scope was introduced through the mouth and advanced to the second part of the duodenum. Retroflexion was performed in the cardia, fundus and incisura. Prior to the procedure, the patient's H. Pylori status was  unknown. The patient experienced no blood loss. The procedure was not difficult. The patient tolerated the procedure well. There were no apparent adverse events. ANESTHESIA INFORMATION: ASA: II Anesthesia Type: IV Sedation with Anesthesia MEDICATIONS: No administrations occurring from 1048 to 1115 on 08/07/24 FINDINGS: The stomach and duodenum appeared normal. Performed random biopsy using biopsy forceps to rule out celiac disease and H. pylori. SPECIMENS: ID Type Source Tests Collected by Time Destination 1 : duodemun bx r/o celiac Tissue Duodenum TISSUE EXAM Regan Rodriguez DO 8/7/2024 10:59 AM  2 : grastric bx r/o HP Tissue Stomach TISSUE EXAM Regan Rodriguez DO 8/7/2024 11:01 AM  3 : RANDOM COLON BX R/O MICROSCOPIC COLITIS Tissue Colon TISSUE EXAM Regan Rodriguez DO 8/7/2024 11:14 AM      Impression: The stomach and duodenum appeared normal. Performed random biopsy to rule out celiac disease and H. pylori. RECOMMENDATION: Await pathology results   Regan Rodriguez DO

## 2024-10-29 ENCOUNTER — OFFICE VISIT (OUTPATIENT)
Dept: OBGYN CLINIC | Facility: CLINIC | Age: 19
End: 2024-10-29
Payer: COMMERCIAL

## 2024-10-29 VITALS
WEIGHT: 142 LBS | DIASTOLIC BLOOD PRESSURE: 60 MMHG | SYSTOLIC BLOOD PRESSURE: 100 MMHG | BODY MASS INDEX: 23.66 KG/M2 | HEIGHT: 65 IN

## 2024-10-29 DIAGNOSIS — Z11.3 SCREEN FOR STD (SEXUALLY TRANSMITTED DISEASE): ICD-10-CM

## 2024-10-29 DIAGNOSIS — A74.9 CHLAMYDIA: Primary | ICD-10-CM

## 2024-10-29 PROCEDURE — 99213 OFFICE O/P EST LOW 20 MIN: CPT | Performed by: OBSTETRICS & GYNECOLOGY

## 2024-10-29 NOTE — PROGRESS NOTES
PROBLEM GYNECOLOGICAL VISIT    Nesha Cary is a 18 y.o. female who presents today for gyn  fu pt w  hx of  being treated for chlamydia .   .  Her general medical history has been reviewed and she reports it as follows:    Past Medical History:   Diagnosis Date    Chlamydia 2024    treated doxy    Heavy periods     Seasonal allergies      Past Surgical History:   Procedure Laterality Date    WISDOM TOOTH EXTRACTION       OB History          0    Para   0    Term   0       0    AB   0    Living   0         SAB   0    IAB   0    Ectopic   0    Multiple   0    Live Births   0           Obstetric Comments   Menarche at  11    Gardasil  vaccine              Social History     Tobacco Use    Smoking status: Never     Passive exposure: Never    Smokeless tobacco: Never   Vaping Use    Vaping status: Never Used   Substance Use Topics    Alcohol use: Never     Comment: No alcohol use     Drug use: Never     Comment: No        Current Outpatient Medications   Medication Instructions    dicyclomine (BENTYL) 10 mg, Oral, 4 times daily (before meals and at bedtime)    ibuprofen (MOTRIN) 200 mg tablet Oral, Every 6 hours PRN    norethindrone-ethinyl estradiol-ferrous fumarate (Louann 24 FE) 1-20 MG-MCG(24) per tablet 1 tablet, Oral, Daily, Pt likes  28 d  w  Brand necessary    omeprazole (PRILOSEC) 20 mg, Oral, Daily before breakfast       History of Present Illness:   On ocp no missed pils denies ACHES and BTB.   Treated for chlamydia and her partner    Review of Systems:  Review of Systems   Constitutional: Negative.    Respiratory: Negative.     Cardiovascular: Negative.    Gastrointestinal: Negative.    Endocrine: Negative.    Genitourinary:  Positive for menstrual problem.   Musculoskeletal: Negative.    Skin: Negative.    Neurological: Negative.    Hematological: Negative.    Psychiatric/Behavioral: Negative.       Physical Exam:  There were no vitals taken for this visit.  Physical Exam  Genitourinary:       Vulva, bladder, rectum and urethral meatus normal.      No lesions in the vagina.      Right Labia: No rash, tenderness, lesions or skin changes.     Left Labia: No tenderness, lesions, skin changes or rash.     No labial fusion noted.      Vaginal bleeding present.      No vaginal discharge, erythema, tenderness or ulceration.      No vaginal prolapse present.     No vaginal atrophy present.       Right Adnexa: not tender, not full and no mass present.     Left Adnexa: not tender, not full and no mass present.     No parametrium nodularity or thickening present.     Uterus is not enlarged, fixed, tender or prolapsed.      No urethral prolapse, tenderness, mass or discharge present.      Pelvic Floor: Levator muscle strength is 4/5.     Pelvic floor neuro is intact.     Pelvic exam was performed with patient in the lithotomy position.   Abdominal:      General: Abdomen is flat. Bowel sounds are normal.      Palpations: Abdomen is soft.   Musculoskeletal:         General: Normal range of motion.   Neurological:      Mental Status: She is oriented to person, place, and time.   Skin:     General: Skin is warm and dry.   Psychiatric:         Mood and Affect: Mood normal.         Behavior: Behavior normal.         Thought Content: Thought content normal.         Judgment: Judgment normal.   Vitals and nursing note reviewed.         Assessment:   1.  Hx of chlamydia   treat to cure  partner treated  2.  Contraceptive education  3. Sti prevention.     Plan:GUERITA gc/chlam done.  Condoms.  Counseling on risks and benefits of  LARCS.  Pt w hx of   Heavier and cramping w  her menstrual period, would go w  prog  IUD.  Will run through  insurance.  Pt has menses now.  NO intercourse .   Will do   HCG.  Eat and take  2 motrin one hour prior. Can be painful going in and she may have  irregular bleeding.   Must use condoms.      I have spent a total time of 20 minutes in caring for this patient on the day of the visit/encounter  including Diagnostic results, Prognosis, Risks and benefits of tx options, Instructions for management, Patient and family education, Importance of tx compliance, Risk factor reductions, Counseling / Coordination of care, Documenting in the medical record, Reviewing / ordering tests, medicine, procedures  , and Obtaining or reviewing history  .   Reviewed with patient that [facility], but that they will not necessarily be reviewed by me immediately.  Explained that I will review results at my earliest opportunity and contact patient appropriately.

## 2024-10-30 NOTE — PROGRESS NOTES
Here for insertion of Paragard prev on OCP   IUD Procedure    Date/Time: 10/31/2024 2:49 PM    Performed by: MARY LOU Sandoval  Authorized by: MARY LOU Sandoval    Other Assisting Provider: Yes (comment)    Written consent obtained?: Yes    Consent given by:  Patient  Time Out:     Time out performed at:  10/31/2024 2:06 PM  Patient states understanding of procedure being performed: Yes    Patient identity confirmed:  Verbally with patient  Select procedure: IUD insertion    IUD Insertion:     IUD type:  Intrauterine copper  Post-procedure:     Patient tolerated procedure well: yes      Patient will follow up after next period: yes

## 2024-10-31 ENCOUNTER — PROCEDURE VISIT (OUTPATIENT)
Dept: OBGYN CLINIC | Facility: CLINIC | Age: 19
End: 2024-10-31
Payer: COMMERCIAL

## 2024-10-31 VITALS
DIASTOLIC BLOOD PRESSURE: 64 MMHG | WEIGHT: 143 LBS | SYSTOLIC BLOOD PRESSURE: 110 MMHG | BODY MASS INDEX: 23.82 KG/M2 | HEIGHT: 65 IN

## 2024-10-31 DIAGNOSIS — Z32.02 NEGATIVE PREGNANCY TEST: ICD-10-CM

## 2024-10-31 DIAGNOSIS — Z30.430 ENCOUNTER FOR IUD INSERTION: Primary | ICD-10-CM

## 2024-10-31 LAB — SL AMB POCT URINE HCG: NORMAL

## 2024-10-31 PROCEDURE — 58300 INSERT INTRAUTERINE DEVICE: CPT | Performed by: NURSE PRACTITIONER

## 2024-10-31 PROCEDURE — 81025 URINE PREGNANCY TEST: CPT | Performed by: NURSE PRACTITIONER

## 2024-10-31 RX ORDER — COPPER 313.4 MG/1
INTRAUTERINE DEVICE INTRAUTERINE
Status: COMPLETED | OUTPATIENT
Start: 2024-10-31 | End: 2024-10-31

## 2024-10-31 RX ADMIN — COPPER: 313.4 INTRAUTERINE DEVICE INTRAUTERINE at 15:31

## 2024-10-31 RX ADMIN — COPPER: 313.4 INTRAUTERINE DEVICE INTRAUTERINE at 14:49

## 2024-10-31 NOTE — PATIENT INSTRUCTIONS
IUD Procedure    Date/Time: 10/31/2024 2:07 PM    Performed by: MARY LOU Sandoval  Authorized by: MARY LOU Sandoval    Other Assisting Provider: Yes (comment)    Verbal consent obtained?: Yes    Written consent obtained?: Yes    Risks and benefits: Risks, benefits and alternatives were discussed    Consent given by:  Patient  Time Out:     Time out performed at:  10/31/2024 2:06 PM  Patient states understanding of procedure being performed: Yes    Patient identity confirmed:  Verbally with patient  Select procedure: IUD insertion    IUD Insertion:     Pelvic exam performed: yes      Cervix cleaned and prepped: yes      Speculum placed in vagina: yes      Tenaculum applied to cervix: yes      IUD inserted with no complications: yes      Strings trimmed: yes      Uterus sounded: yes      Uterus sound depth (cm):  7    IUD type:  Intrauterine copper  Post-procedure:     Patient tolerated procedure well: yes      Patient will follow up after next period: yes    Insertion Comments:      ParaGard  IUD inserted as requested. Spotty irregular bleeding may persist up to 6 months.. Always condom use for STI prevention. Return to office in 4 weeks after next menses. Call office with any bright red or excessive bleeding, fever, severe pelvic pain or foul discharge. Check string monthly after menses. May repeat motrin dose 4 hours from last dose with food.

## 2024-11-02 LAB
C TRACH RRNA SPEC QL NAA+PROBE: NEGATIVE
N GONORRHOEA RRNA SPEC QL NAA+PROBE: NEGATIVE

## 2024-11-18 NOTE — PROGRESS NOTES
Adult Annual Physical  Name: Nesha Cary      : 2005      MRN: [id number]  Encounter Provider: Mary Hoskins DO  Encounter Date: 2024   Encounter department: [facility]    Assessment & Plan  Annual physical exam  Discussed diet and exercise  Immunizations up to date  Declines flu vaccine today  Dental exam up to date.   Discussed safe sex  She has no issues with hearing or vision.   Will obtain records from previous PCP office for review       Inattention  Issues with inattention, impulsivity and hyperactivity.   Adult ADHD questionnaire completed as noted below.   I put in referral to psych but advised that there can be wait list  Also gave her # for a psychologist who does evaluations for ADHD (Val Avalos PsyD)and she can contact her office as well.   Orders:  •  Ambulatory referral to Psych Services; Future      Immunizations and preventive care screenings were discussed with patient today. Appropriate education was printed on patient's after visit summary.    Counseling:  Alcohol/drug use: discussed moderation in alcohol intake, the recommendations for healthy alcohol use, and avoidance of illicit drug use.  Dental Health: discussed importance of regular tooth brushing, flossing, and dental visits.  Injury prevention: discussed safety/seat belts, safety helmets, smoke detectors, carbon monoxide detectors, and smoking near bedding or upholstery.  Sexual health: discussed sexually transmitted diseases, partner selection, use of condoms, avoidance of unintended pregnancy, and contraceptive alternatives.  Exercise: the importance of regular exercise/physical activity was discussed. Recommend exercise 3-5 times per week for at least 30 minutes.          History of Present Illness     Adult Annual Physical:  Patient presents for annual physical.     Diet and Physical Activity:  - Diet/Nutrition: well balanced diet and consuming 3-5 servings of fruits/vegetables  daily.  - Exercise: 3-4 times a week on average. cardio and lifting at gym    Depression Screening:  - PHQ-2 Score: 0    General Health:  - Sleep: sleeps well.  - Hearing: normal hearing right ear and normal hearing left ear.  - Vision: no vision problems.  - Dental: regular dental visits.    /GYN Health:  - Follows with GYN: yes.   - Menopause: premenopausal.   - Last menstrual cycle: 11/3/2024.   - History of STDs: yes  - Contraception: barrier methods and IUD placement.      Patient is a 18 year old female who is being seen today as a new patient for her physical exam.   Previous PCP= Ingris Bradford MD at [facility].   No records for review at time of this visit  She is seeing [facility]'s ob/gyn for her dysmenorrhea, menorrhagia. On OC's.     Has concerns and wants to be evaluated for ADHD. She states that she has had difficulty in school since middle school.   Issues with focusing.   Some difficultly sitting still   Grades in  were okay  Having a hard time with the longer classes in college.     DSM-5 Criteria for ADHD:    1.Inattention: Six or more symptoms of inattention for children up to age 16, or five or more for adolescents 17 and older and   adults; symptoms of inattention have been present for at least 6 months, and they are inappropriate for developmental   level:  ? Often fails to give close attention to details or makes careless mistakes in schoolwork, at work, or with other activities. yes  ? Often has trouble holding attention on tasks or play activities. yes  ? Often does not seem to listen when spoken to directly. yes  ? Often does not follow through on instructions and fails to finish schoolwork, chores, or duties in the workplace (e.g., loses   focus, side-tracked). no  ? Often has trouble organizing tasks and activities. yes  ? Often avoids, dislikes, or is reluctant to do tasks that require mental effort over a long period of time (such as schoolwork or   homework). yes  ?  "Often loses things necessary for tasks and activities (e.g. school materials, pencils, books, tools, wallets, keys, paperwork,   eyeglasses, mobile telephones). yes  ? Is often easily distracted yes  ? Is often forgetful in daily activities. no  2. Hyperactivity and Impulsivity: Six or more symptoms of hyperactivity-impulsivity for children up to age 16, or five or more   for adolescents 17 and older and adults; symptoms of hyperactivity-impulsivity have been present for at least 6 months   to an extent that is disruptive and inappropriate for the person’s developmental level:  ? Often fidgets with or taps hands or feet, or squirms in seat. yes  ? Often leaves seat in situations when remaining seated is expected. yes  ? Often runs about or climbs in situations where it is not appropriate (adolescents or adults may be limited to feeling restless). yes  ? Often unable to play or take part in leisure activities quietly. no  ? Is often \"on the go\" acting as if \"driven by a motor\".no  ? Often talks excessively. yes  ? Often blurts out an answer before a question has been completed. yes  ? Often has trouble waiting his/her turn. no  ? Often interrupts or intrudes on others (e.g., butts into conversations or games) no    In addition, the following conditions must be met:  • Several inattentive or hyperactive-impulsive symptoms were present before age 12 years. no  • Several symptoms are present in two or more setting, (e.g., at home, school or work; with friends or relatives; in other activities). yes  • There is clear evidence that the symptoms interfere with, or reduce the quality of, social, school, or work functioning. yes  • The symptoms do not happen only during the course of schizophrenia or another psychotic disorder. The symptoms are not better explained by another mental disorder (e.g. Mood Disorder, Anxiety Disorder, Dissociative Disorder, or a Personality    Disorder).              Review of Systems  Medical " History Reviewed by provider this encounter:  Tobacco  Allergies  Meds  Problems  Med Hx  Surg Hx  Fam Hx  Soc   Hx    .  Current Outpatient Medications on File Prior to Visit   Medication Sig Dispense Refill   • dicyclomine (BENTYL) 10 mg capsule Take 1 capsule (10 mg total) by mouth 4 (four) times a day (before meals and at bedtime) 120 capsule 6   • ibuprofen (MOTRIN) 200 mg tablet Take by mouth every 6 (six) hours as needed     • omeprazole (PriLOSEC) 20 mg delayed release capsule Take 1 capsule (20 mg total) by mouth daily before breakfast 30 capsule 6   • [DISCONTINUED] norethindrone-ethinyl estradiol-ferrous fumarate (Louann 24 FE) 1-20 MG-MCG(24) per tablet Take 1 tablet by mouth daily Pt likes  28 d  w  Brand necessary (Patient not taking: Reported on 10/31/2024) 90 tablet 3     No current facility-administered medications on file prior to visit.        Objective   LMP 10/28/2024     Physical Exam  Vitals and nursing note reviewed.   Constitutional:       General: She is not in acute distress.     Appearance: Normal appearance. She is not ill-appearing, toxic-appearing or diaphoretic.   HENT:      Head: Normocephalic and atraumatic.      Right Ear: Tympanic membrane normal.      Left Ear: Tympanic membrane normal.      Nose: Nose normal.      Mouth/Throat:      Mouth: Mucous membranes are moist.   Eyes:      Extraocular Movements: Extraocular movements intact.      Conjunctiva/sclera: Conjunctivae normal.      Pupils: Pupils are equal, round, and reactive to light.   Cardiovascular:      Rate and Rhythm: Normal rate and regular rhythm.      Heart sounds: No murmur heard.  Pulmonary:      Effort: Pulmonary effort is normal.      Breath sounds: Normal breath sounds.   Abdominal:      General: Abdomen is flat. Bowel sounds are normal.      Palpations: Abdomen is soft.   Musculoskeletal:      Cervical back: Normal range of motion and neck supple.      Right lower leg: No edema.      Left lower leg: No  edema.   Lymphadenopathy:      Cervical: No cervical adenopathy.   Skin:     General: Skin is warm and dry.      Findings: No rash.   Neurological:      General: No focal deficit present.      Mental Status: She is alert and oriented to person, place, and time.      Motor: No weakness.      Coordination: Coordination normal.      Deep Tendon Reflexes: Abnormal reflex: patellar DTR absent bilaterally.   Psychiatric:         Mood and Affect: Mood normal.

## 2024-11-18 NOTE — PATIENT INSTRUCTIONS
"Patient Education     Routine physical for adults   The Basics   Written by the doctors and editors at [facility]   What is a physical? -- A physical is a routine visit, or \"check-up,\" with your doctor. You might also hear it called a \"wellness visit\" or \"preventive visit.\"  During each visit, the doctor will:   Ask about your physical and mental health   Ask about your habits, behaviors, and lifestyle   Do an exam   Give you vaccines if needed   Talk to you about any medicines you take   Give advice about your health   Answer your questions  Getting regular check-ups is an important part of taking care of your health. It can help your doctor find and treat any problems you have. But it's also important for preventing health problems.  A routine physical is different from a \"sick visit.\" A sick visit is when you see a doctor because of a health concern or problem. Since physicals are scheduled ahead of time, you can think about what you want to ask the doctor.  How often should I get a physical? -- It depends on your age and health. In general, for people age 21 years and older:   If you are younger than 50 years, you might be able to get a physical every 3 years.   If you are 50 years or older, your doctor might recommend a physical every year.  If you have an ongoing health condition, like diabetes or high blood pressure, your doctor will probably want to see you more often.  What happens during a physical? -- In general, each visit will include:   Physical exam - The doctor or nurse will check your height, weight, heart rate, and blood pressure. They will also look at your eyes and ears. They will ask about how you are feeling and whether you have any symptoms that bother you.   Medicines - It's a good idea to bring a list of all the medicines you take to each doctor visit. Your doctor will talk to you about your medicines and answer any questions. Tell them if you are having any side effects that bother you. You " "should also tell them if you are having trouble paying for any of your medicines.   Habits and behaviors - This includes:   Your diet   Your exercise habits   Whether you smoke, drink alcohol, or use drugs   Whether you are sexually active   Whether you feel safe at home  Your doctor will talk to you about things you can do to improve your health and lower your risk of health problems. They will also offer help and support. For example, if you want to quit smoking, they can give you advice and might prescribe medicines. If you want to improve your diet or get more physical activity, they can help you with this, too.   Lab tests, if needed - The tests you get will depend on your age and situation. For example, your doctor might want to check your:   Cholesterol   Blood sugar   Iron level   Vaccines - The recommended vaccines will depend on your age, health, and what vaccines you already had. Vaccines are very important because they can prevent certain serious or deadly infections.   Discussion of screening - \"Screening\" means checking for diseases or other health problems before they cause symptoms. Your doctor can recommend screening based on your age, risk, and preferences. This might include tests to check for:   Cancer, such as breast, prostate, cervical, ovarian, colorectal, prostate, lung, or skin cancer   Sexually transmitted infections, such as chlamydia and gonorrhea   Mental health conditions like depression and anxiety  Your doctor will talk to you about the different types of screening tests. They can help you decide which screenings to have. They can also explain what the results might mean.   Answering questions - The physical is a good time to ask the doctor or nurse questions about your health. If needed, they can refer you to other doctors or specialists, too.  Adults older than 65 years often need other care, too. As you get older, your doctor will talk to you about:   How to prevent falling at " home   Hearing or vision tests   Memory testing   How to take your medicines safely   Making sure that you have the help and support you need at home  All topics are updated as new evidence becomes available and our peer review process is complete.  This topic retrieved from [employer] on: May 02, 2024.  Topic 190779 Version 1.0  Release: 32.4.3 - C32.122  © 2024 UpToDate, Inc. and/or its affiliates. All rights reserved.  Consumer Information Use and Disclaimer   Disclaimer: This generalized information is a limited summary of diagnosis, treatment, and/or medication information. It is not meant to be comprehensive and should be used as a tool to help the user understand and/or assess potential diagnostic and treatment options. It does NOT include all information about conditions, treatments, medications, side effects, or risks that may apply to a specific patient. It is not intended to be medical advice or a substitute for the medical advice, diagnosis, or treatment of a health care provider based on the health care provider's examination and assessment of a patient's specific and unique circumstances. Patients must speak with a health care provider for complete information about their health, medical questions, and treatment options, including any risks or benefits regarding use of medications. This information does not endorse any treatments or medications as safe, effective, or approved for treating a specific patient. UpToDate, Inc. and its affiliates disclaim any warranty or liability relating to this information or the use thereof.The use of this information is governed by the Terms of Use, available at https://www.w[employer]tersEd[employer] Genomeuwer.com/en/know/clinical-effectiveness-terms. 2024© UpToDate, Inc. and its affiliates and/or licensors. All rights reserved.  Copyright   © 2024 UpToDate, Inc. and/or its affiliates. All rights reserved.

## 2024-11-18 NOTE — PROGRESS NOTES
Assessment/Plan:   ParaGard IUD in place.  Check string monthly after menses. Call with any concerns. Irregular vaginal bleeding may persist up to 6 months  Vaginal discharge No odor + clue cells will treat with Metrogel 1 applicator HS x 5 nights No sex while on treatment G/C sent for completeness       Diagnoses and all orders for this visit:    IUD check up    Possible exposure to STD  -     Ct, Ng, Trich vag by RITIKA    Vaginal discharge  -     metroNIDAZOLE (METROGEL) 0.75 % vaginal gel; Insert 1 Application into the vagina daily at bedtime for 5 days  -     POCT wet mount  -     Ct, Ng, Trich vag by RITIKA          Subjective:      Patient ID: Nesha Cary is a 18 y.o. female.    Here for IUD check Paragard inserted 10/31/2024 prev on OCP which she stopped right at insertion Had some spotting for a few days post insertion then ok Some cramping which has improved SA no issues  She is c/o a lot of wet discharge Denies odor Prev treated Chlamydia and neg culture 3 months later 10/29/2024 SA same partner         The following portions of the patient's history were reviewed and updated as appropriate: allergies, current medications, past family history, past medical history, past social history, past surgical history, and problem list.    Review of Systems   Gastrointestinal:  Negative for abdominal pain.   Genitourinary:  Positive for vaginal discharge. Negative for dyspareunia, menstrual problem, pelvic pain and vaginal bleeding.   Neurological:  Negative for headaches.   Psychiatric/Behavioral:  Negative for dysphoric mood. The patient is not nervous/anxious.          Objective:      /68   Wt 65.8 kg (145 lb)   LMP 10/28/2024   BMI 24.13 kg/m²          Physical Exam  Vitals and nursing note reviewed.   Constitutional:       Appearance: Normal appearance.   Abdominal:      Palpations: Abdomen is soft.      Tenderness: There is no abdominal tenderness.   Genitourinary:     General: Normal vulva.      Exam  position: Lithotomy position.      Vagina: Normal.      Cervix: No cervical motion tenderness or cervical bleeding.      Uterus: Normal.       Comments: IUD string noted at os A lot of mucous clear discharge   Neurological:      Mental Status: She is alert and oriented to person, place, and time.   Psychiatric:         Mood and Affect: Mood normal.

## 2024-11-19 ENCOUNTER — OFFICE VISIT (OUTPATIENT)
Dept: FAMILY MEDICINE CLINIC | Facility: CLINIC | Age: 19
End: 2024-11-19
Payer: COMMERCIAL

## 2024-11-19 VITALS
DIASTOLIC BLOOD PRESSURE: 60 MMHG | SYSTOLIC BLOOD PRESSURE: 118 MMHG | HEART RATE: 70 BPM | BODY MASS INDEX: 24.33 KG/M2 | OXYGEN SATURATION: 98 % | WEIGHT: 146.2 LBS

## 2024-11-19 DIAGNOSIS — R41.840 INATTENTION: ICD-10-CM

## 2024-11-19 DIAGNOSIS — Z00.00 ANNUAL PHYSICAL EXAM: Primary | ICD-10-CM

## 2024-11-19 PROCEDURE — 99385 PREV VISIT NEW AGE 18-39: CPT | Performed by: FAMILY MEDICINE

## 2024-11-21 ENCOUNTER — OFFICE VISIT (OUTPATIENT)
Dept: OBGYN CLINIC | Facility: CLINIC | Age: 19
End: 2024-11-21
Payer: COMMERCIAL

## 2024-11-21 VITALS — SYSTOLIC BLOOD PRESSURE: 110 MMHG | WEIGHT: 145 LBS | BODY MASS INDEX: 24.13 KG/M2 | DIASTOLIC BLOOD PRESSURE: 68 MMHG

## 2024-11-21 DIAGNOSIS — N89.8 VAGINAL DISCHARGE: ICD-10-CM

## 2024-11-21 DIAGNOSIS — Z20.2 POSSIBLE EXPOSURE TO STD: ICD-10-CM

## 2024-11-21 DIAGNOSIS — Z30.431 IUD CHECK UP: Primary | ICD-10-CM

## 2024-11-21 LAB
BV WHIFF TEST VAG QL: ABNORMAL
CLUE CELLS SPEC QL WET PREP: ABNORMAL
PH SMN: 5 [PH]
SL AMB POCT WET MOUNT: ABNORMAL
T VAGINALIS VAG QL WET PREP: ABNORMAL
YEAST VAG QL WET PREP: ABNORMAL

## 2024-11-21 PROCEDURE — 87210 SMEAR WET MOUNT SALINE/INK: CPT | Performed by: NURSE PRACTITIONER

## 2024-11-21 PROCEDURE — 99213 OFFICE O/P EST LOW 20 MIN: CPT | Performed by: NURSE PRACTITIONER

## 2024-11-21 RX ORDER — METRONIDAZOLE 7.5 MG/G
1 GEL VAGINAL
Qty: 70 G | Refills: 0 | Status: SHIPPED | OUTPATIENT
Start: 2024-11-21 | End: 2024-11-26

## 2024-11-21 NOTE — PATIENT INSTRUCTIONS
ParaGard IUD in place.  Check string monthly after menses. Call with any concerns. Irregular vaginal bleeding may persist up to 6 months  Vaginal discharge No odor + clue cells will treat with Metrogel 1 applicator HS x 5 nights No sex while on treatment G/C sent for completeness     ' 2017

## 2024-11-23 LAB
C TRACH RRNA SPEC QL NAA+PROBE: NEGATIVE
N GONORRHOEA RRNA SPEC QL NAA+PROBE: NEGATIVE
T VAGINALIS RRNA SPEC QL NAA+PROBE: NEGATIVE

## 2024-11-25 ENCOUNTER — RESULTS FOLLOW-UP (OUTPATIENT)
Dept: OBGYN CLINIC | Facility: CLINIC | Age: 19
End: 2024-11-25

## 2024-12-04 ENCOUNTER — TELEPHONE (OUTPATIENT)
Age: 19
End: 2024-12-04

## 2024-12-04 ENCOUNTER — OFFICE VISIT (OUTPATIENT)
Dept: GASTROENTEROLOGY | Facility: CLINIC | Age: 19
End: 2024-12-04
Payer: COMMERCIAL

## 2024-12-04 VITALS
BODY MASS INDEX: 24.32 KG/M2 | WEIGHT: 146 LBS | SYSTOLIC BLOOD PRESSURE: 111 MMHG | HEIGHT: 65 IN | DIASTOLIC BLOOD PRESSURE: 57 MMHG

## 2024-12-04 DIAGNOSIS — Z98.890 HX OF COLONOSCOPY: ICD-10-CM

## 2024-12-04 DIAGNOSIS — K21.9 GASTROESOPHAGEAL REFLUX DISEASE, UNSPECIFIED WHETHER ESOPHAGITIS PRESENT: Primary | ICD-10-CM

## 2024-12-04 DIAGNOSIS — R10.84 GENERALIZED ABDOMINAL PAIN: ICD-10-CM

## 2024-12-04 DIAGNOSIS — R19.5 LOOSE BOWEL MOVEMENTS: ICD-10-CM

## 2024-12-04 PROCEDURE — 99214 OFFICE O/P EST MOD 30 MIN: CPT | Performed by: NURSE PRACTITIONER

## 2024-12-04 RX ORDER — DICYCLOMINE HCL 20 MG
20 TABLET ORAL
Qty: 120 TABLET | Refills: 6 | Status: SHIPPED | OUTPATIENT
Start: 2024-12-04

## 2024-12-04 NOTE — TELEPHONE ENCOUNTER
Contacted patient in regards to Routine Referral in attempts to verify patient's needs of services and add patient to proper wait list. LVM for patient to contact intake dept  in regards to referral.     2nd attempt, referral closed.

## 2024-12-04 NOTE — TELEPHONE ENCOUNTER
Patient has been added to the Medication Management and Talk Therapy wait list with a referral.    Insurance: [employer]  Insurance Type:    Commercial [x]   Medicaid []   [facility] (if applicable)   Medicare []  Location Preference: Anywhere  Provider Preference: Anyone  Virtual: Yes [] No [x]  Were outside resources sent: Yes [x] No []

## 2024-12-04 NOTE — PROGRESS NOTES
[facility] - Outpatient Follow-up Note  Nesha Cary 18 y.o. female MRN: [id number]  Encounter: [id number]    ASSESSMENT AND PLAN:      1. Gastroesophageal reflux disease, unspecified whether esophagitis present (Primary)  Patient presents for follow-up and review medication administration.  Patient states currently she does have acid reflux breakthrough depending on dietary discretion.  Otherwise, she states omeprazole 20 mg daily appears to be doing the job.  Discussed with patient FODMAP diet, monitoring for trigger foods and removing them.  Patient states that dairy is a known trigger.    Reviewed recent EGD report; stomach and duodenum appeared normal, biopsy for celiac disease and H. pylori negative.  -Continue omeprazole 20 mg daily.  -Food diary, monitor for triggering foods.    2. Loose bowel movements  Patient states she continues with 2-3 loose bowel movements daily.  Patient does identify some trigger foods.  Discussed with patient likely IBS/D with patient's history and current setting.  Discussed with patient FODMAP diet and adjusting dietary discretion.  Also discussed with patient to review after the holiday and if symptoms continue with revised medical treatment plan may consider amitriptyline 10 mg at bedtime trial.    -FODMAP diet, Monitor food triggers.    3. Generalized abdominal pain  Patient states that her generalized abdominal discomfort occurs primarily after eating.  Discussed with patient monitoring for future and see if most of the discomfort would be in the epigastric or right upper quadrant area.  Discussed increasing dicyclomine from 10 mg to 20 mg 4 times a day.  Ultrasound of the abdomen to also check for cholelithiasis.     -Discussed low-fat diet regarding increased discomfort after eating.  Patient does note that dairy is a trigger.  - dicyclomine (BENTYL) 20 mg tablet; Take 1 tablet (20 mg total) by mouth 4 (four) times a day (before  meals and at bedtime)  Dispense: 120 tablet; Refill: 6  - US abdomen complete; Future    4. Hx of colonoscopy  Colonoscopy 8/7/2024; recall noted.      Followup Appointment: 3 to 4 months  ______________________________________________________________________      HPI:    18-year-old female with no significant past medical history presents for follow-up GERD, loose bowel movements and generalized abdominal discomfort.  Patient states since last office visit her symptoms have slightly improved however patient continues to have increased abdominal discomfort noted primarily after meals.  Discussed with patient increasing her dicyclomine from 10 mg 4 times a day to 20 mg 4 times a day.  Patient's dates that she also has increased acid reflux symptoms depending on what she eats.  Patient to continue omeprazole 20 mg daily.  Also spoke to the patient regarding FODMAP diet and trying to eliminate some of the things noted.  She does already recognize that dairy does not settle well.  On exam, patient denies nausea or vomiting.  Notes lower abdominal discomfort with palpation.  Acid reflux symptoms well-controlled with omeprazole 20 mg daily.  Patient states she continues to have 2-3 loose bowel movements daily.  She denies hematochezia or melena.  States her weight is stable.  EGD and colonoscopy on 8/7/2021; colonoscopy essentially normal with no recall until age appropriate.  EGD essentially normal as well no recall or changes to medication administration.      Historical Information   Past Medical History:   Diagnosis Date    Chlamydia 07/2024    treated doxy    GERD (gastroesophageal reflux disease)     Heavy periods     Seasonal allergies      Past Surgical History:   Procedure Laterality Date    WISDOM TOOTH EXTRACTION       Social History     Substance and Sexual Activity   Alcohol Use Never    Comment: No alcohol use      Social History     Substance and Sexual Activity   Drug Use Never    Comment: No      Social  "History     Tobacco Use   Smoking Status Never    Passive exposure: Never   Smokeless Tobacco Never     Family History   Adopted: Yes   Problem Relation Age of Onset    Breast cancer Mother 47    Thyroid disease Mother     Diabetes type I Mother     Diabetes type I Father     Colon cancer Family     Ovarian cancer Family          Current Outpatient Medications:     dicyclomine (BENTYL) 20 mg tablet    omeprazole (PriLOSEC) 20 mg delayed release capsule  No Known Allergies  Reviewed medications and allergies and updated as indicated    PHYSICAL EXAM:    Blood pressure 111/57, height 5' 5\" (1.651 m), weight 66.2 kg (146 lb). Body mass index is 24.3 kg/m².    General Appearance: NAD, cooperative, alert  Eyes: Anicteric, PERRLA, EOMI  ENT:  Normocephalic, atraumatic, normal mucosa.    Neck:  Supple, symmetrical, trachea midline  Resp:  Clear to auscultation bilaterally; no rales, rhonchi or wheezing; respirations unlabored   CV:  S1 S2, Regular rate and rhythm; no murmur, rub, or gallop.  GI:  Soft, lower abdominal discomfort with palpation, non-distended; normal bowel sounds; no masses, no organomegaly   Rectal: Deferred  Musculoskeletal: No cyanosis, clubbing or edema. Normal ROM.  Skin:  No jaundice, rashes, or lesions   Heme/Lymph: No palpable cervical lymphadenopathy  Psych: Normal affect, good eye contact  Neuro: No gross deficits, AAOx3    Lab Results:   Lab Results   Component Value Date    WBC 3.7 05/07/2021    HGB 13.6 05/07/2021    HCT 39.2 05/07/2021    MCV 91 05/07/2021     05/07/2021     No results found for: \"NA\", \"K\", \"CL\", \"CO2\", \"ANIONGAP\", \"BUN\", \"CREATININE\", \"GLUCOSE\", \"GLUF\", \"CALCIUM\", \"CORRECTEDCA\", \"AST\", \"ALT\", \"ALKPHOS\", \"PROT\", \"BILITOT\", \"EGFR\"  No results found for: \"IRON\", \"TIBC\", \"FERRITIN\"  No results found for: \"LIPASE\"    Radiology Results:   No results found.  "

## 2025-01-22 ENCOUNTER — TELEPHONE (OUTPATIENT)
Age: 20
End: 2025-01-22

## 2025-01-22 NOTE — TELEPHONE ENCOUNTER
"Behavioral Health Outpatient Intake Questions    Referred By   : PCP    Please advise interviewee that they need to answer all questions truthfully to allow for best care, and any misrepresentations of information may affect their ability to be seen at this clinic   => Was this discussed? Yes     If Minor Child (under age 18)    Who is/are the legal guardian(s) of the child?     Is there a custody agreement?      If \"YES\"- Custody orders must be obtained prior to scheduling the first appointment  In addition, Consent to Treatment must be signed by all legal guardians prior to scheduling the first appointment    If \"NO\"- Consent to Treatment must be signed by all legal guardians prior to scheduling the first appointment    Behavioral Health Outpatient Intake History -     Presenting Problem (in patient's own words): Suspicion of ADHD and want to potential manage with medication if needed    Are there any communication barriers for this patient?     No                                               If yes, please describe barriers:   If there is a unique situation, please refer to Santhosh Gomez/Emilie Mac for final determination.    Are you taking any psychiatric medications? No     If \"YES\" -What are they      If \"YES\" -Who prescribes?     Has the Patient previously received outpatient Talk Therapy or Medication Management from [facility]  No        If \"YES\"- When, Where and with Whom?         If \"NO\" -Has Patient received these services elsewhere?       If \"YES\" -When, Where, and with Whom?    Has the Patient abused alcohol or other substances in the last 6 months ? No       If \"YES\" -What substance, How much, How often?     If illegal substance: Refer to [facility] (for [facility]) or [facility].   If Alcohol in excess of 10 drinks per week:  Refer to [facility] (for [facility]) or [facility]    Legal History-     Is this treatment court ordered? No   If \"yes \"send to :  Talk Therapy : Send to Santhosh Gomez for " "final determination   Med Management: Send to Dr. Conner for final determination     Has the Patient been convicted of a felony?  No   If \"Yes\" send to -When, What?  Talk Therapy: Send to Santhosh Gomez for final determination   Med Management: Send to Dr. Conner for final determination     ACCEPTED as a patient Yes  If \"Yes\" Appointment Date:   Teresa Huitron 4/22 @ 1130a    Referred Elsewhere? No  If “Yes” - (Where? Ex: [facility], [facility], [facility], [facility], etc.)       Name of Insurance Co:[employer])  Insurance ID#[id number]   Insurance Phone #  If ins is primary or secondary?  If patient is a minor, parents information such as Name, D.O.B of guarantor.  "

## 2025-01-22 NOTE — TELEPHONE ENCOUNTER
Contacted patient off of Medication Management  to verify needs of services in attempts to offer patient an appointment. spoke with patient whom stated they are still interested in services

## 2025-01-23 ENCOUNTER — HOSPITAL ENCOUNTER (OUTPATIENT)
Dept: ULTRASOUND IMAGING | Facility: HOSPITAL | Age: 20
End: 2025-01-23
Payer: COMMERCIAL

## 2025-01-23 DIAGNOSIS — R10.84 GENERALIZED ABDOMINAL PAIN: ICD-10-CM

## 2025-01-23 PROCEDURE — 76700 US EXAM ABDOM COMPLETE: CPT

## 2025-01-29 ENCOUNTER — RESULTS FOLLOW-UP (OUTPATIENT)
Dept: GASTROENTEROLOGY | Facility: CLINIC | Age: 20
End: 2025-01-29

## 2025-04-14 NOTE — PSYCH
PSYCHIATRIC EVALUATION     Name: Nesha Cary      : 2005      MRN: [id number]  Encounter Provider: MARY LOU Figueroa  Encounter Date: 2025   Encounter department: [facility] OUTPATIENT    Insurance: Payor: [employer] / Plan: [facility] [employer] MEDICAID / Product Type: Medicaid HMO /      Reason for visit: Establish Care for medication management:  Assessment & Plan  SERENITY (generalized anxiety disorder)   - Declines initiation of any psychotropic medication today       Attention and concentration deficit   - Recommended Neuropsych testing for formal diagnosis- will contact insurance company          Impression:  SERENITY  Attention and concentration deficit        Declines initiation of of any psychotropic medication today  Recommended to call insurance to inquire about ADHD testing through Neuropsych   Recommend outpatient therapy-Not interested   Medical follow up with PCP as needed  Follow up in 3 months     Treatment Plan:  Next treatment plan due 10/22/2025. Next crisis plan due next appointment.     Treatment Recommendations/Precautions:    Educated about diagnosis and treatment modalities. Verbalizes understanding and agreement with the treatment plan.  Discussed self monitoring of symptoms, and symptom monitoring tools.  Discussed medications and if treatment adjustment was needed or desired.  Aware of 24 hour and weekend coverage for urgent situations accessed by calling [facility] main practice number  I am scheduling this patient out for greater than 3 months: No    Medications Risks/Benefits:      Risks, Benefits And Possible Side Effects Of Medications:    Risks, benefits, and possible side effects of medications explained to Nesha and she (or legal representative) verbalizes understanding and agreement for treatment.    Controlled Medication Discussion:     Not applicable      History of Present Illness     HPI  "   Nesha is a 19 year old female being seen today for an initial psychiatric evaluation for medication management.  Patient does not have any previous psychiatric diagnoses. Patient is not currently being prescribed any psychotropic medications. Patient is not currently connected to outpatient therapy and is not interested in being placed on the wait list.   No additional services in place at this time.     Nesha reports she is not on any medications and she is not interested in therapy at this time.  She does not have any previous psychiatric diagnoses.  She states her primary care physician referred her to [facility] [place] due to possible ADHD symptoms.  Alayna states she was adopted by her biological grandmother and her grandmother's . (She calls them mom and dad).  She states most of her uncles in her biological family have been diagnosed with ADHD, but her biological brother does not have ADHD.  Nesha reports her mood is \"nervous\" due to this appointment.  Her sleep is adequate and she sleeps well approximately 7 hours per night.  Appetite is adequate with 3 meals per day.  She does have past issues with restrictive eating, however, denies that this is occurring currently.  Energy levels and motivation fluctuate daily.    Nesha endorses acute and chronic anxiety, pathologic in nature, and suggestive of SERENITY (generalized anxiety disorder).  She states she feels she always had anxiety but this got worse when she got out of high school.  She cannot identify a trigger, but states she got anxious just about \"the future\" and getting out on her own.  She states all her friends leaving for college and moving away and she stayed back close to her anxiety.  She rates her anxiety a 5/10 on the severity scale with 10 being the most severe.  Nesha reports excessive nervousness, irrational worry, and overt anxiousness.  She is pervasively restless, tense, keyed-up, and chronically on-edge.  She experiences disruption in " "energy and concentration secondary to anxiety.  Nesha experiences irritability, inability to relax, and denies disruption in sleep secondary to pathologic anxiety.  Nesha reports panic attacks with her last 1 occurring this past Sunday night when she was dog sitting and something \"freaked her out\".  She states she had heart palpitations and felt like her heart was beating out of her chest, hyperventilation, and pulm sweating.  She states this lasted approximately 2 hours.  Denies new-onset panic symptomatology or maladaptive behaviors. Throughout today's session, Nesha appears visibly unsettled. SERENITY-7 score 15.    Nesha denies ever struggling with depression.  Denies any feelings of guilt, worthlessness, or hopelessness.  She denies SI, HI, AH, or VH.  She denies any past or present self injures behavior.  She does not endorse any mood swings and reports mild irritability typically when she is anxious.  Denies any periods of frequent crying spells.  Nesha denies any periods in the past or currently of elevated moods or marion.  She denies any symptoms suggestive of PTSD or OCD.    Nesha reports she was never formally diagnosed with ADHD.  She states in grade school and high school she never noticed ADHD symptoms.  She believes it is because the classes were not as long and only approximately 35 to 45 minutes.  Her relationships with her teachers were good and Nesha states she \"breathed by in high school\" in relation to her grades.  She states now that she is [employer] for [employer]ology, she states she has trouble focusing and has a hard time sitting still and doing her homework.  She reports she has trouble focusing and believes it is due to the lectures being so long approximately 1 to 2 hours.  She states if it is a boring topic or if it gets too difficult she gets overwhelmed quickly and easily.  Nesha states she procrastinates a lot but does get her homework done in time.  She states her attention and focus do not " "affect her job at all because it is so busy and she is on the \"go go go\".  She really just notices her lack of focus during very long lectures and she feels fidgety.  She also reports when she gets excited she cannot stay on one topic and bounces from thing to thing.  He also states she is a perfectionist and worries about getting good grades.  Nesha denies any cardiac or addiction history.  Advised Nesha that my recommendation would be to start an antidepressant such as Lexapro to better control her anxiety.  She declines initiation of psychotropic medication at this time.  Recommended Nesha contact her insurance company to inquire about neuropsych testing for a formal ADHD diagnosis.  Nesha is agreeable to this plan.  No medications prescribed at this time      Psychiatric Review Of Systems:    Pertinent items are noted in HPI; all others negative    Review Of Systems: A review of systems is obtained and is negative except for the pertinent positives listed in HPI/Subjective above.      Current Rating Scores:     Current PHQ-9   PHQ-2/9 Depression Screening    Little interest or pleasure in doing things: 0 - not at all  Feeling down, depressed, or hopeless: 0 - not at all  Trouble falling or staying asleep, or sleeping too much: 2 - more than half the days  Feeling tired or having little energy: 2 - more than half the days  Poor appetite or overeatin - not at all  Feeling bad about yourself - or that you are a failure or have let yourself or your family down: 0 - not at all  Trouble concentrating on things, such as reading the newspaper or watching television: 3 - nearly every day  Moving or speaking so slowly that other people could have noticed. Or the opposite - being so fidgety or restless that you have been moving around a lot more than usual: 2 - more than half the days  Thoughts that you would be better off dead, or of hurting yourself in some way: 0 - not at all  PHQ-9 Score: 9  PHQ-9 Interpretation: " Mild depression       Current SERENITY-7   SERENITY-7 Flowsheet Screening      Flowsheet Row Most Recent Value   Over the last two weeks, how often have you been bothered by the following problems?     Feeling nervous, anxious, or on edge 3   Not being able to stop or control worrying 3   Worrying too much about different things 3   Trouble relaxing  2   Being so restless that it's hard to sit still 2   Becoming easily annoyed or irritable  1   Feeling afraid as if something awful might happen 1   How difficult have these problems made it for you to do your work, take care of things at home, or get along with other people?  Somewhat difficult   SERENITY Score  15            Areas of Improvement: reviewed in HPI/Subjective Section and reviewed in Assessment and Plan Section      Historical Information      Past Psychiatric History:     Past Inpatient Psychiatric Treatment:   No history of past inpatient psychiatric admissions  Past Outpatient Psychiatric Treatment:    No history of past outpatient psychiatric treatment  Past Suicide Attempts: no  Past Violent Behavior: no  Past Psychiatric Medication Trials: None    Traumatic History:     Abuse:none  Other Traumatic Events: none    Family Psychiatric History:     Family History   Adopted: Yes   Problem Relation Age of Onset    Breast cancer Mother 47    Thyroid disease Mother     Diabetes type I Mother     Diabetes type I Father     Colon cancer Family     Ovarian cancer Family        Substance Use History:    Tobacco, Alcohol and Drug Use History     Tobacco Use    Smoking status: Never     Passive exposure: Never    Smokeless tobacco: Never   Vaping Use    Vaping status: Never Used   Substance Use Topics    Alcohol use: Never     Comment: No alcohol use     Drug use: Never     Comment: No      Alcohol Use: Not At Risk (4/20/2021)    AUDIT-C     Frequency of Alcohol Consumption: Never     Additional Substance Use Detail:    Alcohol use: occasional, social use  Recreational drug  "use:   Cocaine: denies use  Heroin: denies use  Cannabis:  Smokes 3-4 times per week - calms down   Other drugs:   denies use  Longest clean time: not applicable  History of Inpatient/Outpatient rehabilitation program: no  Smoking history: denies use  Use of caffeine: 1 cup of caffeinated coffee per day(s)    Social History:    Education:  College- [employer]- Radiology  Learning Disabilities: none  Marital History: single  Children: none  Living Arrangement: lives in home with Parents  Occupational History: part time- serving/bartending  Functioning Relationships: good support system, supportive network  Legal History: none   History: None  Access to firearms: guns, locked up    Social History     Socioeconomic History    Marital status: Single     Spouse name: Not on file    Number of children: Not on file    Years of education: Not on file    Highest education level: Not on file   Occupational History    Occupation: Student    Other Topics Concern    Not on file   Social History Narrative    Mammo: Never    Colonoscopy: Never    Dexa: Never     Sexual abuse: No    Exercise: Occasionally    Domestic violence: No    - As per [employer]         Student at [employer] (sophomore). Studying radiology     Past Medical History:   Diagnosis Date    Chlamydia 07/2024    treated doxy    GERD (gastroesophageal reflux disease)     Headache 03/26/2024    ED visit. CT Head \"no evidence of intracranial hemorrhage, mass effect or midline shift\"    Heavy periods     Juvenile osteochondrosis     followed by [facility]    Pelvic pain 07/09/2024    pelvic [facility] at [facility] normal.    Psoriasis     [facility]    Seasonal allergies      Past Surgical History:   Procedure Laterality Date    WISDOM TOOTH EXTRACTION       Allergies: No Known Allergies    Current Outpatient Medications   Medication Instructions    dicyclomine (BENTYL) 20 mg, Oral, 4 times daily (before meals and at bedtime)    omeprazole (PRILOSEC) 20 mg, " Oral, Daily before breakfast        Medical History Reviewed by provider this encounter:  Tobacco  Allergies  Meds  Problems  Med Hx  Surg Hx  Fam Hx         Objective   There were no vitals taken for this visit.     Mental Status Evaluation:    Appearance age appropriate, casually dressed   Behavior cooperative, calm   Speech normal rate, normal volume, normal pitch, spontaneous   Mood anxious   Affect normal range and intensity, appropriate   Thought Processes organized, goal directed   Thought Content no overt delusions   Perceptual Disturbances: no auditory hallucinations, no visual hallucinations   Abnormal Thoughts  Risk Potential Suicidal ideation - None  Homicidal ideation - None  Potential for aggression - No   Orientation oriented to person, place, time/date, and situation   Memory recent and remote memory grossly intact   Consciousness alert and awake   Attention Span Concentration Span attention span and concentration are age appropriate   Intellect appears to be of average intelligence   Insight intact   Judgement intact   Muscle Strength and  Gait normal muscle strength and normal muscle tone, normal gait and normal balance   Motor activity no abnormal movements   Language no difficulty naming common objects, no difficulty repeating a phrase, no difficulty writing a sentence   Fund of Knowledge adequate knowledge of current events  adequate fund of knowledge regarding past history  adequate fund of knowledge regarding vocabulary    Pain none   Pain Scale Not asked to formally rate          Laboratory Results: I have personally reviewed all pertinent laboratory/tests results    Most Recent Labs:   Lab Results   Component Value Date    WBC 3.7 05/07/2021    RBC 4.33 05/07/2021    HGB 13.6 05/07/2021    HCT 39.2 05/07/2021     05/07/2021    RDW 12.3 05/07/2021    NEUTROABS 1.4 05/07/2021    RPR Non Reactive 07/29/2024       Suicide/Homicide Risk Assessment:    Risk of Harm to Self:  The  following ratings are based on assessment at the time of the interview  Historical Risk Factors include: history of anxiety  Protective Factors: no current suicidal ideation, ability to adapt to change, able to make plans for the future, able to manage anger well, access to mental health treatment, compliant with mental health treatment, connection to community, effective coping skills    Risk of Harm to Others:  The following ratings are based on assessment at the time of the interview  Historical Risk Factors include: none  Protective Factors: no current homicidal ideation, ability to adapt to change, able to manage anger well, access to mental health treatment, compliant with mental health treatment, effective coping skills    The following interventions are recommended: Continue medication management. No other intervention changes indicated at this time.    Treatment Plan:    Completed and signed during the session: Yes - with Nesha.    Depression Follow-up Plan Completed: Yes    Note Share: This note was not shared with the patient due to reasonable likelihood of causing patient harm          Visit Time  Visit Start Time: 11:25 AM  Visit Stop Time: 12:00 PM  Total Visit Duration:  35 minutes    MARY LOU Figueroa 04/22/25

## 2025-04-22 ENCOUNTER — OFFICE VISIT (OUTPATIENT)
Dept: PSYCHIATRY | Facility: CLINIC | Age: 20
End: 2025-04-22
Payer: COMMERCIAL

## 2025-04-22 DIAGNOSIS — F41.1 GAD (GENERALIZED ANXIETY DISORDER): Primary | ICD-10-CM

## 2025-04-22 DIAGNOSIS — R41.840 ATTENTION AND CONCENTRATION DEFICIT: ICD-10-CM

## 2025-04-22 PROCEDURE — 90792 PSYCH DIAG EVAL W/MED SRVCS: CPT

## 2025-04-22 NOTE — BH TREATMENT PLAN
TREATMENT PLAN (Medication Management Only)        [facility]    Name and Date of Birth:  Nesha Cary 19 y.o. 2005  MRN: [id number]  Date of Treatment Plan: April 22, 2025  Diagnosis/Diagnoses:  Attention and focus deficit  Strengths/Personal Resources for Self-Care: ability to adapt to life changes, ability to communicate needs, ability to communicate well, ability to listen, ability to reason, ability to understand psychiatric illness, average or above intelligence.  Area/Areas of need (in own words): ADHD symptoms  1. Long Term Goal:   maintain control of ADHD symptoms.  Target Date:6 months - 10/22/2025  Person/Persons responsible for completion of goal: Nesha  2.  Short Term Objective (s) - How will we reach this goal?:   A.  Provider new recommended medication/dosage changes and/or continue medication(s): continue current medications as prescribed.  B.  Maintain medication compliance .  C.  Attend follow-up appointments as scheduled .  Target Date:6 months - 10/22/2025  Person/Persons Responsible for Completion of Goal: Nesha  Progress Towards Goals: starting treatment  Treatment Modality: medication management every 6 weeks  Review due 180 days from date of this plan: 6 months - 10/22/2025  Expected length of service: ongoing treatment unless revised  My Physician/PA/NP and I have developed this plan together and I agree to work on the goals and objectives. I understand the treatment goals that were developed for my treatment.   Electronic Signatures: on file (unless signed below)    MARY LOU Figueroa 04/22/25

## 2025-07-21 ENCOUNTER — TELEPHONE (OUTPATIENT)
Dept: OTHER | Facility: OTHER | Age: 20
End: 2025-07-21

## 2025-07-22 NOTE — TELEPHONE ENCOUNTER
Writer called to rescheduled appointment, client answered and will call back to schedule when able.

## 2025-07-22 NOTE — TELEPHONE ENCOUNTER
Patient is calling regarding cancelling an appointment.     Date/Time: 07/22/25 at 3:30 pm     Patient was rescheduled: YES [ ] NO [ X]     Patient requesting call back to reschedule: YES [X ] NO [ ]    Patient is sick, please give her a call back to reschedule.